# Patient Record
Sex: MALE | Race: WHITE | NOT HISPANIC OR LATINO | URBAN - METROPOLITAN AREA
[De-identification: names, ages, dates, MRNs, and addresses within clinical notes are randomized per-mention and may not be internally consistent; named-entity substitution may affect disease eponyms.]

---

## 2017-06-06 ENCOUNTER — ALLSCRIPTS OFFICE VISIT (OUTPATIENT)
Dept: OTHER | Facility: OTHER | Age: 57
End: 2017-06-06

## 2017-06-06 DIAGNOSIS — Z72.0 TOBACCO USE: ICD-10-CM

## 2018-01-11 NOTE — PROGRESS NOTES
Assessment    1  Encounter for preventive health examination (V70 0) (Z00 00)   2  Impaired fasting glucose (790 21) (R73 01)    Plan  DM (diabetes mellitus screen), Impaired fasting glucose, Screening cholesterol level,  Screening PSA (prostate specific antigen)    · ([facility]) CBC+Platelet+Hem Review; Status:Active; Requested TVN:[id number];    · ([facility]) Comp  Metabolic Panel (13); Status:Active; Requested BTM:[id number];    · ([facility]) Lipid Panel; Status:Active; Requested BIV:[id number];    · ([facility]) PSA Total (Reflex To Free); Status:Active; Requested QOD:[id number];   SocHx: Current smoker    · * CT LUNG SCREENING PROGRAM; Status:Hold For - Scheduling; Requested  DVF:[id number];     Discussion/Summary  Impression: health maintenance visit  Currently, he eats an adequate diet and has an adequate exercise regimen  Prostate cancer screening: PSA was ordered  Colorectal cancer screening: colorectal cancer screening is current, colonoscopy is needed every five years and the next colonoscopy is due 2018  Screening lab work includes glucose and lipid profile  The immunizations will be given as outlined in the orders  Advice and education were given regarding tobacco cessation  The patient was counseled regarding diagnostic results, impressions  The treatment plan was reviewed with the patient/guardian  The patient/guardian understands and agrees with the treatment plan      Chief Complaint  wellness      History of Present Illness  HM, Adult Male: The patient is being seen for a health maintenance evaluation  The last health maintenance visit was ? year(s) ago  Social History: Household members include spouse, 1 daughter(s), 1 son(s) and 10 y/o grandchild  He is   Work status: retired   The patient is a current cigarette smoker  He 1 Pack - 1 1/2 Pack a day  He is not ready to quit using tobacco  He reports never drinking alcohol  He has never used illicit drugs  General Health:  The patient's health since the last visit is described as good  He has regular dental visits  The patient reports his last dental visit was april 2017  Dental problems: no tooth pain and no caries  He complains of vision problems  Vision care includes wearing glasses and having regular eye examinations  He denies hearing loss  Hearing is normal   He doesn't wear a hearing aid  Immunizations status: not up to date The patient needs the following immunization(s): influenza vaccine  Lifestyle:  He consumes a diverse and healthy diet  He does not have any weight concerns  He exercises regularly  He exercises 7 times per week for 60 minutes per session  Exercise includes walking and swimming  He uses tobacco  The patient is a current cigarette smoker  He denies alcohol use  He reports never drinking alcohol  He denies drug use  He has never used illicit drugs  Reproductive health:  the patient is sexually active  He is monogamous with a male partner  birth control is not being practiced  He denies erectile dysfunction  Screening: Prostate cancer screening includes prostate-specific antigen testing last year  Colorectal cancer screening includes last colonoscopy done 2013  metabolic screening reviewed and current  Cardiovascular risk factors: tobacco use, but no hypertension, no diabetes, no high LDL cholesterol, no low HDL cholesterol, no stress, no obesity, no illicit drug use, no sedentary lifestyle and no family history of cardiovascular disease  Review of Systems    Constitutional: no fever, not feeling poorly, no recent weight gain, no chills, not feeling tired and no recent weight loss  Cardiovascular: no chest pain and no palpitations  Respiratory: no shortness of breath, no cough and no shortness of breath during exertion  Gastrointestinal: no abdominal pain, no constipation and no diarrhea  Genitourinary: nocturia, but no dysuria and no incontinence  Musculoskeletal: no arthralgias and no myalgias  Integumentary: no rashes  Active Problems    1  DM (diabetes mellitus screen) (V77 1) (Z13 1)   2  Erectile dysfunction (607 84) (N52 9)   3  Screening cholesterol level (V77 91) (Z13 220)   4  Screening PSA (prostate specific antigen) (V76 44) (Z12 5)    Past Medical History    · History of Inguinal hernia (550 90) (K40 90)    Surgical History    · History of Inguinal Hernia Repair    Family History  Father    · Family history of    · Family history of myocardial infarction (V17 3) (Z82 49)    Social History    · Denied: Alcohol Use (History)   · Current smoker (305 1) (Z72 0)   · 1 ppd since his teen years   · Exercising Regularly    Current Meds   1  Daily Value Multivitamin Oral Tablet Recorded   2  Viagra 100 MG Oral Tablet; TAKE  TABLET AS NEEDED; Therapy: 73CVC5540 to (Evaluate:2017); Last Rx:[id number] Ordered    Allergies    1  No Known Environmental Allergies    Vitals   Recorded: 40DPQ8679 10:37AM   Temperature 98 2 F   Heart Rate 78   Respiration 16   Systolic 485   Diastolic 60   Height 5 ft 8 2 in   Weight 157 lb 0 4 oz   BMI Calculated 23 74   BSA Calculated 1 85   O2 Saturation 98     Physical Exam    Constitutional   General appearance: No acute distress, well appearing and well nourished  Head and Face   Head and face: Normal     Eyes   Conjunctiva and lids: No erythema, swelling or discharge  Ears, Nose, Mouth, and Throat   Otoscopic examination: Tympanic membranes translucent with normal light reflex  Canals patent without erythema  Oropharynx: Normal with no erythema, edema, exudate or lesions  Neck   Neck: Supple, symmetric, trachea midline, no masses  Thyroid: Normal, no thyromegaly  Pulmonary   Respiratory effort: No increased work of breathing or signs of respiratory distress  Auscultation of lungs: Clear to auscultation  Cardiovascular   Auscultation of heart: Normal rate and rhythm, normal S1 and S2, no murmurs      Examination of extremities for edema and/or varicosities: Normal     Abdomen   Abdomen: Non-tender, no masses  Liver and spleen: No hepatomegaly or splenomegaly  Lymphatic   Palpation of lymph nodes in neck: No lymphadenopathy  Musculoskeletal   Gait and station: Normal     Psychiatric   Orientation to person, place and time: Normal     Mood and affect: Normal        Results/Data  PHQ-2 Adult Depression Screening 06Jun2017 10:39AM User, Ahs     Test Name Result Flag Reference   PHQ-2 Adult Depression Score 0     Over the last two weeks, how often have you been bothered by any of the following problems? Little interest or pleasure in doing things: Not at all - 0  Feeling down, depressed, or hopeless: Not at all - 0   PHQ-2 Adult Depression Screening Negative         Health Management  Health Maintenance   COLONOSCOPY; every 5 years; Last [id number]; Next Due: 32Htu0388;  Active    Signatures   Electronically signed by : GERARDO Corey ; Jun 6 2017 10:58AM EST                       (Author)

## 2018-01-12 VITALS
SYSTOLIC BLOOD PRESSURE: 100 MMHG | RESPIRATION RATE: 16 BRPM | DIASTOLIC BLOOD PRESSURE: 60 MMHG | BODY MASS INDEX: 23.8 KG/M2 | HEIGHT: 68 IN | HEART RATE: 78 BPM | WEIGHT: 157.03 LBS | TEMPERATURE: 98.2 F | OXYGEN SATURATION: 98 %

## 2018-06-07 ENCOUNTER — OFFICE VISIT (OUTPATIENT)
Dept: INTERNAL MEDICINE CLINIC | Facility: CLINIC | Age: 58
End: 2018-06-07
Payer: COMMERCIAL

## 2018-06-07 VITALS
DIASTOLIC BLOOD PRESSURE: 78 MMHG | BODY MASS INDEX: 21.89 KG/M2 | SYSTOLIC BLOOD PRESSURE: 119 MMHG | HEART RATE: 80 BPM | OXYGEN SATURATION: 97 % | WEIGHT: 161.6 LBS | RESPIRATION RATE: 16 BRPM | HEIGHT: 72 IN

## 2018-06-07 DIAGNOSIS — Z13.220 SCREENING, LIPID: ICD-10-CM

## 2018-06-07 DIAGNOSIS — B07.0 PLANTAR WART OF LEFT FOOT: ICD-10-CM

## 2018-06-07 DIAGNOSIS — F17.200 CURRENT EVERY DAY SMOKER: ICD-10-CM

## 2018-06-07 DIAGNOSIS — Z00.00 WELLNESS EXAMINATION: Primary | ICD-10-CM

## 2018-06-07 DIAGNOSIS — M79.672 LEFT FOOT PAIN: ICD-10-CM

## 2018-06-07 DIAGNOSIS — Z11.59 NEED FOR HEPATITIS C SCREENING TEST: ICD-10-CM

## 2018-06-07 DIAGNOSIS — N52.9 ERECTILE DYSFUNCTION, UNSPECIFIED ERECTILE DYSFUNCTION TYPE: ICD-10-CM

## 2018-06-07 DIAGNOSIS — Z13.1 SCREENING FOR DIABETES MELLITUS: ICD-10-CM

## 2018-06-07 DIAGNOSIS — R73.01 IMPAIRED FASTING GLUCOSE: ICD-10-CM

## 2018-06-07 DIAGNOSIS — Z12.5 SCREENING PSA (PROSTATE SPECIFIC ANTIGEN): ICD-10-CM

## 2018-06-07 PROCEDURE — 99396 PREV VISIT EST AGE 40-64: CPT | Performed by: INTERNAL MEDICINE

## 2018-06-07 RX ORDER — SILDENAFIL 100 MG/1
1 TABLET, FILM COATED ORAL DAILY PRN
COMMUNITY
Start: 2016-07-29 | End: 2021-06-09 | Stop reason: ALTCHOICE

## 2018-06-07 RX ORDER — ERGOCALCIFEROL (VITAMIN D2) 10 MCG
1 TABLET ORAL DAILY
COMMUNITY

## 2018-06-07 NOTE — PROGRESS NOTES
Assessment/Plan:  Impression: health maintenance visit  Currently, he eats an adequate diet and has an adequate exercise regimen  Prostate cancer screening: PSA was ordered  Colorectal cancer screening: colorectal cancer screening is current, colonoscopy is needed every five years and the next colonoscopy is due 2018  Screening lab work includes glucose and lipid profile  The immunizations will be given as outlined in the orders  Advice and education were given regarding tobacco cessation  The patient was counseled regarding diagnostic results, impressions  The treatment plan was reviewed with the patient/guardian  The patient/guardian understands and agrees with the treatment plan             Problem List Items Addressed This Visit     Erectile dysfunction    Impaired fasting glucose    Relevant Orders    CBC and differential    Comprehensive metabolic panel    Wellness examination - Primary    Screening for diabetes mellitus    Screening, lipid    Relevant Orders    Lipid panel    Current every day smoker    Relevant Orders    CT lung screening program    CBC and differential    Comprehensive metabolic panel    Plantar wart of left foot    Relevant Orders    Ambulatory referral to Podiatry    Need for hepatitis C screening test    Relevant Orders    Hepatitis C antibody    Screening PSA (prostate specific antigen)    Relevant Orders    PSA, Total Screen      Other Visit Diagnoses     Left foot pain                Subjective:      Patient ID: Patrica New is a 59 y o  male  HPI  History of Present Illness  HM, Adult Male: The patient is being seen for a health maintenance evaluation  The last health maintenance visit was 1 year(s) ago  Social History: Household members include spouse, 1 daughter(s), 1 son(s) and 6 y/o grandchild  He is   Work status: retired   The patient is a current cigarette smoker  He smokes 1 Pack - 1 1/2 Pack a day   He is not ready to quit using tobacco  He reports never drinking alcohol  He has never used illicit drugs  General Health: The patient's health since the last visit is described as good  He has regular dental visits  Dental problems: no tooth pain and no caries  He complains of vision problems  Vision care includes wearing glasses and having regular eye examinations  He denies hearing loss  Hearing is normal   He doesn't wear a hearing aid  Immunizations status: not up to date The patient needs the following immunization(s):He had Adacel, Pneumovax lat year   Lifestyle:  He consumes a diverse and healthy diet  He does not have any weight concerns  He exercises regularly  He exercises 7 times per week for 60 minutes per session  Exercise includes walking and swimming  He uses tobacco  The patient is a current cigarette smoker  He denies alcohol use  He reports never drinking alcohol  He denies drug use  He has never used illicit drugs  [facility] The patient is sexually active  He has erectile dysfunction  Screening: Prostate cancer screening includes prostate-specific antigen testing 2016  Colorectal cancer screening includes last colonoscopy done 2013 Dr Bree Troncoso  Metabolic screening due, last was 2016    Cardiovascular risk factors: tobacco use, but no hypertension, no diabetes, no high LDL cholesterol, no low HDL cholesterol, no stress, no obesity, no illicit drug use, no sedentary lifestyle and no family history of cardiovascular disease  The following portions of the patient's history were reviewed and updated as appropriate: allergies, current medications, past family history, past medical history, past social history, past surgical history and problem list     Review of Systems   Constitutional: Negative for fatigue, fever and unexpected weight change  HENT: Negative for ear pain, hearing loss, sinus pain, sinus pressure and sore throat  Respiratory: Negative for cough, shortness of breath and wheezing      Cardiovascular: Negative for chest pain, palpitations and leg swelling  Gastrointestinal: Negative for abdominal pain, constipation, diarrhea, nausea and vomiting  Musculoskeletal: Positive for arthralgias (h/o dislocation of both shoulders ; after shoveling in March, had R shoulder pain/dislocation what he reduced on his own  This is improving )  Negative for myalgias  Skin:        Plantar wart left great toe   Neurological: Negative for dizziness and headaches  Objective:      /78 (BP Location: Right arm, Patient Position: Sitting, Cuff Size: Standard)   Pulse 80   Resp 16   Ht 6' (1 829 m)   Wt 73 3 kg (161 lb 9 6 oz)   SpO2 97%   BMI 21 92 kg/m²          Physical Exam   Constitutional: He is oriented to person, place, and time  He appears well-developed and well-nourished  HENT:   Head: Normocephalic and atraumatic  Right Ear: External ear normal    Left Ear: External ear normal    Mouth/Throat: Oropharynx is clear and moist    Eyes: Conjunctivae are normal    Neck: Neck supple  Cardiovascular: Normal rate, regular rhythm and normal heart sounds  No murmur heard  Pulmonary/Chest: Effort normal and breath sounds normal  No respiratory distress  He has no wheezes  He has no rales  Abdominal: Soft  Bowel sounds are normal  He exhibits no distension and no mass  There is no tenderness  There is no rebound and no guarding  Musculoskeletal: Normal range of motion  Neurological: He is alert and oriented to person, place, and time  Skin: Skin is warm and dry  Psychiatric: He has a normal mood and affect   His behavior is normal  Judgment and thought content normal

## 2018-06-19 ENCOUNTER — HOSPITAL ENCOUNTER (OUTPATIENT)
Dept: RADIOLOGY | Age: 58
Discharge: HOME | End: 2018-06-19
Payer: COMMERCIAL

## 2018-06-19 DIAGNOSIS — F17.200 CURRENT EVERY DAY SMOKER: ICD-10-CM

## 2018-07-27 ENCOUNTER — APPOINTMENT (OUTPATIENT)
Dept: LAB | Facility: CLINIC | Age: 58
End: 2018-07-27
Payer: COMMERCIAL

## 2018-07-27 LAB
ALBUMIN SERPL BCP-MCNC: 3.8 G/DL (ref 3.5–5)
ALP SERPL-CCNC: 77 U/L (ref 46–116)
ALT SERPL W P-5'-P-CCNC: 29 U/L (ref 12–78)
ANION GAP SERPL CALCULATED.3IONS-SCNC: 7 MMOL/L (ref 4–13)
AST SERPL W P-5'-P-CCNC: 19 U/L (ref 5–45)
BASOPHILS # BLD AUTO: 0.03 THOUSANDS/ΜL (ref 0–0.1)
BASOPHILS NFR BLD AUTO: 0 % (ref 0–1)
BILIRUB SERPL-MCNC: 0.8 MG/DL (ref 0.2–1)
BUN SERPL-MCNC: 16 MG/DL (ref 5–25)
CALCIUM SERPL-MCNC: 8.5 MG/DL (ref 8.3–10.1)
CHLORIDE SERPL-SCNC: 103 MMOL/L (ref 100–108)
CHOLEST SERPL-MCNC: 216 MG/DL (ref 50–200)
CO2 SERPL-SCNC: 26 MMOL/L (ref 21–32)
CREAT SERPL-MCNC: 0.98 MG/DL (ref 0.6–1.3)
EOSINOPHIL # BLD AUTO: 0.12 THOUSAND/ΜL (ref 0–0.61)
EOSINOPHIL NFR BLD AUTO: 1 % (ref 0–6)
ERYTHROCYTE [DISTWIDTH] IN BLOOD BY AUTOMATED COUNT: 14.2 % (ref 11.6–15.1)
GFR SERPL CREATININE-BSD FRML MDRD: 83 ML/MIN/1.73SQ M
GLUCOSE P FAST SERPL-MCNC: 97 MG/DL (ref 65–99)
HCT VFR BLD AUTO: 45.2 % (ref 36.5–49.3)
HCV AB SER QL: NORMAL
HDLC SERPL-MCNC: 48 MG/DL (ref 40–60)
HGB BLD-MCNC: 14.5 G/DL (ref 12–17)
IMM GRANULOCYTES # BLD AUTO: 0.02 THOUSAND/UL (ref 0–0.2)
IMM GRANULOCYTES NFR BLD AUTO: 0 % (ref 0–2)
LDLC SERPL CALC-MCNC: 151 MG/DL (ref 0–100)
LYMPHOCYTES # BLD AUTO: 3.59 THOUSANDS/ΜL (ref 0.6–4.47)
LYMPHOCYTES NFR BLD AUTO: 41 % (ref 14–44)
MCH RBC QN AUTO: 28.2 PG (ref 26.8–34.3)
MCHC RBC AUTO-ENTMCNC: 32.1 G/DL (ref 31.4–37.4)
MCV RBC AUTO: 88 FL (ref 82–98)
MONOCYTES # BLD AUTO: 0.79 THOUSAND/ΜL (ref 0.17–1.22)
MONOCYTES NFR BLD AUTO: 9 % (ref 4–12)
NEUTROPHILS # BLD AUTO: 4.32 THOUSANDS/ΜL (ref 1.85–7.62)
NEUTS SEG NFR BLD AUTO: 49 % (ref 43–75)
NONHDLC SERPL-MCNC: 168 MG/DL
NRBC BLD AUTO-RTO: 0 /100 WBCS
PLATELET # BLD AUTO: 253 THOUSANDS/UL (ref 149–390)
PMV BLD AUTO: 11.7 FL (ref 8.9–12.7)
POTASSIUM SERPL-SCNC: 3.7 MMOL/L (ref 3.5–5.3)
PROT SERPL-MCNC: 7.4 G/DL (ref 6.4–8.2)
PSA SERPL-MCNC: 2.3 NG/ML (ref 0–4)
RBC # BLD AUTO: 5.15 MILLION/UL (ref 3.88–5.62)
SODIUM SERPL-SCNC: 136 MMOL/L (ref 136–145)
TRIGL SERPL-MCNC: 86 MG/DL
WBC # BLD AUTO: 8.87 THOUSAND/UL (ref 4.31–10.16)

## 2018-07-27 PROCEDURE — 86803 HEPATITIS C AB TEST: CPT | Performed by: INTERNAL MEDICINE

## 2018-07-27 PROCEDURE — 80053 COMPREHEN METABOLIC PANEL: CPT | Performed by: INTERNAL MEDICINE

## 2018-07-27 PROCEDURE — 85025 COMPLETE CBC W/AUTO DIFF WBC: CPT | Performed by: INTERNAL MEDICINE

## 2018-07-27 PROCEDURE — G0103 PSA SCREENING: HCPCS | Performed by: INTERNAL MEDICINE

## 2018-07-27 PROCEDURE — 36415 COLL VENOUS BLD VENIPUNCTURE: CPT | Performed by: INTERNAL MEDICINE

## 2018-07-27 PROCEDURE — 80061 LIPID PANEL: CPT | Performed by: INTERNAL MEDICINE

## 2018-08-03 DIAGNOSIS — R73.01 IMPAIRED FASTING GLUCOSE: Primary | ICD-10-CM

## 2018-08-03 DIAGNOSIS — E78.2 MIXED HYPERLIPIDEMIA: ICD-10-CM

## 2019-06-10 ENCOUNTER — OFFICE VISIT (OUTPATIENT)
Dept: INTERNAL MEDICINE CLINIC | Facility: CLINIC | Age: 59
End: 2019-06-10
Payer: COMMERCIAL

## 2019-06-10 VITALS
HEART RATE: 67 BPM | WEIGHT: 165 LBS | OXYGEN SATURATION: 97 % | BODY MASS INDEX: 22.35 KG/M2 | HEIGHT: 72 IN | DIASTOLIC BLOOD PRESSURE: 64 MMHG | SYSTOLIC BLOOD PRESSURE: 116 MMHG

## 2019-06-10 DIAGNOSIS — E78.2 MIXED HYPERLIPIDEMIA: ICD-10-CM

## 2019-06-10 DIAGNOSIS — L98.9 LESION OF SKIN OF SCALP: ICD-10-CM

## 2019-06-10 DIAGNOSIS — Z13.1 SCREENING FOR DIABETES MELLITUS: ICD-10-CM

## 2019-06-10 DIAGNOSIS — Z00.00 WELLNESS EXAMINATION: Primary | ICD-10-CM

## 2019-06-10 DIAGNOSIS — Z12.11 SCREENING FOR COLON CANCER: ICD-10-CM

## 2019-06-10 DIAGNOSIS — F17.200 CURRENT EVERY DAY SMOKER: ICD-10-CM

## 2019-06-10 DIAGNOSIS — Z12.5 SCREENING PSA (PROSTATE SPECIFIC ANTIGEN): ICD-10-CM

## 2019-06-10 PROBLEM — Z11.59 NEED FOR HEPATITIS C SCREENING TEST: Status: RESOLVED | Noted: 2018-06-07 | Resolved: 2019-06-10

## 2019-06-10 PROBLEM — Z13.220 SCREENING, LIPID: Status: RESOLVED | Noted: 2018-06-07 | Resolved: 2019-06-10

## 2019-06-10 PROCEDURE — 99396 PREV VISIT EST AGE 40-64: CPT | Performed by: INTERNAL MEDICINE

## 2021-01-13 ENCOUNTER — DOCUMENTATION (OUTPATIENT)
Dept: INTERNAL MEDICINE CLINIC | Facility: CLINIC | Age: 61
End: 2021-01-13

## 2021-01-13 NOTE — PROGRESS NOTES
Three attempts to contact this patient were made to schedule the shingrix vaccine  The third attempt was made on 1/5/21

## 2021-03-30 DIAGNOSIS — Z23 ENCOUNTER FOR IMMUNIZATION: ICD-10-CM

## 2021-04-02 ENCOUNTER — DOCUMENTATION (OUTPATIENT)
Dept: INTERNAL MEDICINE CLINIC | Facility: CLINIC | Age: 61
End: 2021-04-02

## 2021-04-06 ENCOUNTER — IMMUNIZATIONS (OUTPATIENT)
Dept: FAMILY MEDICINE CLINIC | Facility: HOSPITAL | Age: 61
End: 2021-04-06

## 2021-04-06 DIAGNOSIS — Z23 ENCOUNTER FOR IMMUNIZATION: Primary | ICD-10-CM

## 2021-04-06 PROCEDURE — 91300 SARS-COV-2 / COVID-19 MRNA VACCINE (PFIZER-BIONTECH) 30 MCG: CPT

## 2021-04-06 PROCEDURE — 0001A SARS-COV-2 / COVID-19 MRNA VACCINE (PFIZER-BIONTECH) 30 MCG: CPT

## 2021-04-29 ENCOUNTER — IMMUNIZATIONS (OUTPATIENT)
Dept: FAMILY MEDICINE CLINIC | Facility: HOSPITAL | Age: 61
End: 2021-04-29

## 2021-04-29 DIAGNOSIS — Z23 ENCOUNTER FOR IMMUNIZATION: Primary | ICD-10-CM

## 2021-04-29 PROCEDURE — 91300 SARS-COV-2 / COVID-19 MRNA VACCINE (PFIZER-BIONTECH) 30 MCG: CPT

## 2021-04-29 PROCEDURE — 0002A SARS-COV-2 / COVID-19 MRNA VACCINE (PFIZER-BIONTECH) 30 MCG: CPT

## 2021-06-15 ENCOUNTER — OFFICE VISIT (OUTPATIENT)
Dept: INTERNAL MEDICINE CLINIC | Facility: CLINIC | Age: 61
End: 2021-06-15
Payer: COMMERCIAL

## 2021-06-15 VITALS
HEIGHT: 72 IN | HEART RATE: 71 BPM | BODY MASS INDEX: 22.48 KG/M2 | WEIGHT: 166 LBS | DIASTOLIC BLOOD PRESSURE: 76 MMHG | SYSTOLIC BLOOD PRESSURE: 112 MMHG | OXYGEN SATURATION: 97 %

## 2021-06-15 DIAGNOSIS — Z12.2 ENCOUNTER FOR SCREENING FOR LUNG CANCER: ICD-10-CM

## 2021-06-15 DIAGNOSIS — E78.2 MIXED HYPERLIPIDEMIA: ICD-10-CM

## 2021-06-15 DIAGNOSIS — Z12.11 SCREEN FOR COLON CANCER: ICD-10-CM

## 2021-06-15 DIAGNOSIS — Z00.00 ANNUAL PHYSICAL EXAM: Primary | ICD-10-CM

## 2021-06-15 DIAGNOSIS — Z00.00 LABORATORY EXAM ORDERED AS PART OF ROUTINE GENERAL MEDICAL EXAMINATION: ICD-10-CM

## 2021-06-15 DIAGNOSIS — Z12.5 SCREENING PSA (PROSTATE SPECIFIC ANTIGEN): ICD-10-CM

## 2021-06-15 DIAGNOSIS — Z11.4 SCREENING FOR HIV (HUMAN IMMUNODEFICIENCY VIRUS): ICD-10-CM

## 2021-06-15 DIAGNOSIS — F17.200 CURRENT EVERY DAY SMOKER: ICD-10-CM

## 2021-06-15 PROCEDURE — 4004F PT TOBACCO SCREEN RCVD TLK: CPT | Performed by: INTERNAL MEDICINE

## 2021-06-15 PROCEDURE — 3008F BODY MASS INDEX DOCD: CPT | Performed by: INTERNAL MEDICINE

## 2021-06-15 PROCEDURE — 3725F SCREEN DEPRESSION PERFORMED: CPT | Performed by: INTERNAL MEDICINE

## 2021-06-15 PROCEDURE — 99396 PREV VISIT EST AGE 40-64: CPT | Performed by: INTERNAL MEDICINE

## 2021-06-15 NOTE — PROGRESS NOTES
[facility], [place]    NAME: Olga Khalil  AGE: 59 y o  SEX: male  : 1960     DATE: 6/15/2021     Assessment and Plan:     Problem List Items Addressed This Visit        Other    Screening PSA (prostate specific antigen)    Relevant Orders    PSA, Total Screen    Current every day smoker    Relevant Orders    CT lung screening program    Mixed hyperlipidemia    Relevant Orders    CBC and differential    Comprehensive metabolic panel    Lipid Panel with Direct LDL reflex      Other Visit Diagnoses     Annual physical exam    -  Primary    Screening for HIV (human immunodeficiency virus)        Relevant Orders    HIV 1/2 [facility]ib[facility]4th Generation[facility]    Laboratory exam ordered as part of routine general medical examination        Relevant Orders    CBC and differential    Comprehensive metabolic panel    Lipid Panel with Direct LDL reflex    Encounter for screening for lung cancer        Relevant Orders    CT lung screening program    Screen for colon cancer        Relevant Orders    Cologuard          Immunizations and preventive care screenings were discussed with patient today  Appropriate education was printed on patient's after visit summary  Consider Shingrix    Counseling:  · Smoking cessation  Tobacco Cessation Counseling: Tobacco cessation counseling and education was provided  The patient is sincerely urged to quit consumption of tobacco  He is not ready to quit tobacco  The numerous health risks of tobacco consumption were discussed  If he decides to quit, there are a number of helpful adjunctive aids, and he can see me to discuss nicotine replacement therapy, chantix, or bupropion anytime in the future  Return in about 1 year (around 6/15/2022)       Chief Complaint:     Chief Complaint   Patient presents with    Annual Exam      History of Present Illness:     Adult Annual Physical   Patient here for a comprehensive physical exam  The patient reports no problems  Diet and Physical Activity  · Diet/Nutrition: well balanced diet, limited junk food and consuming 3-5 servings of fruits/vegetables daily  · Exercise: walking and 5-7 times a week on average  Depression Screening  PHQ-9 Depression Screening    PHQ-9:   Frequency of the following problems over the past two weeks:      Little interest or pleasure in doing things: 0 - not at all  Feeling down, depressed, or hopeless: 0 - not at all  PHQ-2 Score: 0       General Health  · Sleep: sleeps well  · Hearing: normal - bilateral   · Vision: most recent eye exam >1 year ago  · Dental: regular dental visits   Health  · Symptoms include: none     Review of Systems:     Review of Systems   Constitutional: Negative for chills, fatigue, fever and unexpected weight change  HENT: Negative for congestion, rhinorrhea and sore throat  Respiratory: Negative for cough, shortness of breath and wheezing  Cardiovascular: Negative for chest pain, palpitations and leg swelling  Gastrointestinal: Negative for abdominal pain, blood in stool, constipation, diarrhea, nausea and vomiting  Genitourinary: Negative for difficulty urinating  No nocturia   Musculoskeletal: Positive for arthralgias (right shoulder) and back pain  Negative for myalgias  Neurological: Negative for dizziness and headaches  Psychiatric/Behavioral: Negative for dysphoric mood and sleep disturbance  The patient is not nervous/anxious         Past Medical History:     Past Medical History:   Diagnosis Date    Inguinal hernia     last assessed 9/3/13; resolved 7/29/16      Past Surgical History:     Past Surgical History:   Procedure Laterality Date    INGUINAL HERNIA REPAIR        Family History:     Family History   Problem Relation Age of Onset    Heart attack Father         myocardial infarction    Alcohol abuse Mother       Social History:     Social History     Socioeconomic History    Marital status: /Civil Union     Spouse name: None    Number of children: None    Years of education: None    Highest education level: None   Occupational History    None   Tobacco Use    Smoking status: Current Every Day Smoker    Smokeless tobacco: Never Used    Tobacco comment: 1 ppd since his teen years    Substance and Sexual Activity    Alcohol use: No    Drug use: Yes     Types: Marijuana    Sexual activity: None   Other Topics Concern    None   Social History Narrative    Exercising regularly      Social Determinants of Health     Financial Resource Strain:     Difficulty of Paying Living Expenses:    Food Insecurity:     Worried About Running Out of Food in the Last Year:     Ran Out of Food in the Last Year:    Transportation Needs:     Lack of Transportation (Medical):  Lack of Transportation (Non-Medical):    Physical Activity:     Days of Exercise per Week:     Minutes of Exercise per Session:    Stress:     Feeling of Stress :    Social Connections:     Frequency of Communication with Friends and Family:     Frequency of Social Gatherings with Friends and Family:     Attends Jew Services:     Active Member of Clubs or Organizations:     Attends Club or Organization Meetings:     Marital Status:    Intimate Partner Violence:     Fear of Current or Ex-Partner:     Emotionally Abused:     Physically Abused:     Sexually Abused:       Current Medications:     Current Outpatient Medications   Medication Sig Dispense Refill    Multiple Vitamin (DAILY VALUE MULTIVITAMIN) TABS Take 1 tablet by mouth daily       No current facility-administered medications for this visit  Allergies:     No Known Allergies   Physical Exam:     /76   Pulse 71   Ht 6' (1 829 m)   Wt 75 3 kg (166 lb)   SpO2 97%   BMI 22 51 kg/m²     Physical Exam  Vitals and nursing note reviewed     Constitutional:       General: He is not in acute distress  Appearance: He is well-developed  He is not ill-appearing, toxic-appearing or diaphoretic  HENT:      Head: Normocephalic and atraumatic  Eyes:      Conjunctiva/sclera: Conjunctivae normal    Cardiovascular:      Rate and Rhythm: Normal rate and regular rhythm  Heart sounds: No murmur heard  Pulmonary:      Effort: Pulmonary effort is normal  No respiratory distress  Breath sounds: Normal breath sounds  Abdominal:      General: There is no distension  Palpations: Abdomen is soft  There is no mass  Tenderness: There is no abdominal tenderness  There is no guarding or rebound  Musculoskeletal:      Cervical back: Neck supple  Right lower leg: No edema  Left lower leg: No edema  Skin:     General: Skin is warm and dry  Neurological:      Mental Status: He is alert and oriented to person, place, and time  Psychiatric:         Mood and Affect: Mood normal          Behavior: Behavior normal          Thought Content:  Thought content normal          Judgment: Judgment normal           Isabell Miller MD  [facility]

## 2021-06-15 NOTE — PATIENT INSTRUCTIONS

## 2021-06-18 ENCOUNTER — VBI (OUTPATIENT)
Dept: ADMINISTRATIVE | Facility: OTHER | Age: 61
End: 2021-06-18

## 2021-12-22 ENCOUNTER — IMMUNIZATIONS (OUTPATIENT)
Dept: FAMILY MEDICINE CLINIC | Facility: HOSPITAL | Age: 61
End: 2021-12-22

## 2021-12-22 DIAGNOSIS — Z23 ENCOUNTER FOR IMMUNIZATION: Primary | ICD-10-CM

## 2021-12-22 PROCEDURE — 0001A COVID-19 PFIZER VACC 0.3 ML: CPT

## 2021-12-22 PROCEDURE — 91300 COVID-19 PFIZER VACC 0.3 ML: CPT

## 2022-06-17 DIAGNOSIS — Z11.4 SCREENING FOR HIV (HUMAN IMMUNODEFICIENCY VIRUS): ICD-10-CM

## 2022-06-17 DIAGNOSIS — Z12.5 SCREENING PSA (PROSTATE SPECIFIC ANTIGEN): Primary | ICD-10-CM

## 2022-06-17 DIAGNOSIS — E78.2 MIXED HYPERLIPIDEMIA: ICD-10-CM

## 2022-06-29 ENCOUNTER — APPOINTMENT (OUTPATIENT)
Dept: LAB | Facility: CLINIC | Age: 62
End: 2022-06-29
Payer: COMMERCIAL

## 2022-06-29 DIAGNOSIS — E78.2 MIXED HYPERLIPIDEMIA: ICD-10-CM

## 2022-06-29 DIAGNOSIS — Z11.4 SCREENING FOR HIV (HUMAN IMMUNODEFICIENCY VIRUS): ICD-10-CM

## 2022-06-29 DIAGNOSIS — Z12.5 SCREENING PSA (PROSTATE SPECIFIC ANTIGEN): ICD-10-CM

## 2022-06-29 LAB
ALBUMIN SERPL BCP-MCNC: 3.5 G/DL (ref 3.5–5)
ALP SERPL-CCNC: 101 U/L (ref 46–116)
ALT SERPL W P-5'-P-CCNC: 28 U/L (ref 12–78)
ANION GAP SERPL CALCULATED.3IONS-SCNC: 4 MMOL/L (ref 4–13)
AST SERPL W P-5'-P-CCNC: 21 U/L (ref 5–45)
BASOPHILS # BLD AUTO: 0.03 THOUSANDS/ΜL (ref 0–0.1)
BASOPHILS NFR BLD AUTO: 0 % (ref 0–1)
BILIRUB SERPL-MCNC: 0.5 MG/DL (ref 0.2–1)
BUN SERPL-MCNC: 16 MG/DL (ref 5–25)
CALCIUM SERPL-MCNC: 9 MG/DL (ref 8.3–10.1)
CHLORIDE SERPL-SCNC: 107 MMOL/L (ref 100–108)
CHOLEST SERPL-MCNC: 242 MG/DL
CO2 SERPL-SCNC: 29 MMOL/L (ref 21–32)
CREAT SERPL-MCNC: 0.95 MG/DL (ref 0.6–1.3)
EOSINOPHIL # BLD AUTO: 0.15 THOUSAND/ΜL (ref 0–0.61)
EOSINOPHIL NFR BLD AUTO: 2 % (ref 0–6)
ERYTHROCYTE [DISTWIDTH] IN BLOOD BY AUTOMATED COUNT: 14.6 % (ref 11.6–15.1)
GFR SERPL CREATININE-BSD FRML MDRD: 84 ML/MIN/1.73SQ M
GLUCOSE P FAST SERPL-MCNC: 124 MG/DL (ref 65–99)
HCT VFR BLD AUTO: 45.8 % (ref 36.5–49.3)
HDLC SERPL-MCNC: 40 MG/DL
HGB BLD-MCNC: 15 G/DL (ref 12–17)
IMM GRANULOCYTES # BLD AUTO: 0.03 THOUSAND/UL (ref 0–0.2)
IMM GRANULOCYTES NFR BLD AUTO: 0 % (ref 0–2)
LDLC SERPL CALC-MCNC: 177 MG/DL (ref 0–100)
LYMPHOCYTES # BLD AUTO: 3.16 THOUSANDS/ΜL (ref 0.6–4.47)
LYMPHOCYTES NFR BLD AUTO: 36 % (ref 14–44)
MCH RBC QN AUTO: 28.2 PG (ref 26.8–34.3)
MCHC RBC AUTO-ENTMCNC: 32.8 G/DL (ref 31.4–37.4)
MCV RBC AUTO: 86 FL (ref 82–98)
MONOCYTES # BLD AUTO: 0.95 THOUSAND/ΜL (ref 0.17–1.22)
MONOCYTES NFR BLD AUTO: 11 % (ref 4–12)
NEUTROPHILS # BLD AUTO: 4.53 THOUSANDS/ΜL (ref 1.85–7.62)
NEUTS SEG NFR BLD AUTO: 51 % (ref 43–75)
NRBC BLD AUTO-RTO: 0 /100 WBCS
PLATELET # BLD AUTO: 247 THOUSANDS/UL (ref 149–390)
PMV BLD AUTO: 11.6 FL (ref 8.9–12.7)
POTASSIUM SERPL-SCNC: 4.3 MMOL/L (ref 3.5–5.3)
PROT SERPL-MCNC: 7.5 G/DL (ref 6.4–8.2)
PSA SERPL-MCNC: 2.3 NG/ML (ref 0–4)
RBC # BLD AUTO: 5.31 MILLION/UL (ref 3.88–5.62)
SODIUM SERPL-SCNC: 140 MMOL/L (ref 136–145)
TRIGL SERPL-MCNC: 126 MG/DL
WBC # BLD AUTO: 8.85 THOUSAND/UL (ref 4.31–10.16)

## 2022-06-29 PROCEDURE — 85025 COMPLETE CBC W/AUTO DIFF WBC: CPT

## 2022-06-29 PROCEDURE — 87389 HIV-1 AG W/HIV-1&-2 AB AG IA: CPT

## 2022-06-29 PROCEDURE — 80053 COMPREHEN METABOLIC PANEL: CPT

## 2022-06-29 PROCEDURE — 36415 COLL VENOUS BLD VENIPUNCTURE: CPT

## 2022-06-29 PROCEDURE — 80061 LIPID PANEL: CPT

## 2022-06-29 PROCEDURE — G0103 PSA SCREENING: HCPCS

## 2022-06-30 LAB — HIV 1+2 AB+HIV1 P24 AG SERPL QL IA: NORMAL

## 2022-07-01 ENCOUNTER — RA CDI HCC (OUTPATIENT)
Dept: OTHER | Facility: HOSPITAL | Age: 62
End: 2022-07-01

## 2022-07-01 NOTE — PROGRESS NOTES
[facility]  coding opportunities       Chart reviewed, no opportunity found: CHART REVIEWED, NO OPPORTUNITY FOUND        Patients Insurance        Commercial Insurance: Yudith Lee

## 2022-07-07 ENCOUNTER — HOSPITAL ENCOUNTER (OUTPATIENT)
Dept: CT IMAGING | Facility: HOSPITAL | Age: 62
Discharge: HOME | End: 2022-07-07
Payer: COMMERCIAL

## 2022-07-07 DIAGNOSIS — Z12.2 ENCOUNTER FOR SCREENING FOR LUNG CANCER: ICD-10-CM

## 2022-07-07 DIAGNOSIS — F17.200 CURRENT EVERY DAY SMOKER: ICD-10-CM

## 2022-07-07 PROCEDURE — 71271 CT THORAX LUNG CANCER SCR C-: CPT

## 2022-07-08 ENCOUNTER — OFFICE VISIT (OUTPATIENT)
Dept: INTERNAL MEDICINE CLINIC | Facility: CLINIC | Age: 62
End: 2022-07-08
Payer: COMMERCIAL

## 2022-07-08 VITALS
HEIGHT: 70 IN | WEIGHT: 163.4 LBS | DIASTOLIC BLOOD PRESSURE: 78 MMHG | HEART RATE: 78 BPM | OXYGEN SATURATION: 98 % | BODY MASS INDEX: 23.39 KG/M2 | SYSTOLIC BLOOD PRESSURE: 118 MMHG

## 2022-07-08 DIAGNOSIS — Z12.5 SCREENING PSA (PROSTATE SPECIFIC ANTIGEN): ICD-10-CM

## 2022-07-08 DIAGNOSIS — Z00.00 ANNUAL PHYSICAL EXAM: Primary | ICD-10-CM

## 2022-07-08 DIAGNOSIS — R73.01 IMPAIRED FASTING GLUCOSE: ICD-10-CM

## 2022-07-08 DIAGNOSIS — F17.200 CURRENT EVERY DAY SMOKER: ICD-10-CM

## 2022-07-08 DIAGNOSIS — E78.2 MIXED HYPERLIPIDEMIA: ICD-10-CM

## 2022-07-08 PROBLEM — Z13.1 SCREENING FOR DIABETES MELLITUS: Status: RESOLVED | Noted: 2018-06-07 | Resolved: 2022-07-08

## 2022-07-08 LAB — SL AMB POCT HEMOGLOBIN AIC: 6.2 (ref ?–6.5)

## 2022-07-08 PROCEDURE — 99396 PREV VISIT EST AGE 40-64: CPT | Performed by: INTERNAL MEDICINE

## 2022-07-08 PROCEDURE — 83036 HEMOGLOBIN GLYCOSYLATED A1C: CPT | Performed by: INTERNAL MEDICINE

## 2022-07-08 PROCEDURE — 3725F SCREEN DEPRESSION PERFORMED: CPT | Performed by: INTERNAL MEDICINE

## 2022-07-08 RX ORDER — ATORVASTATIN CALCIUM 20 MG/1
20 TABLET, FILM COATED ORAL DAILY
Qty: 90 TABLET | Refills: 3 | Status: SHIPPED | OUTPATIENT
Start: 2022-07-08

## 2022-07-08 NOTE — PROGRESS NOTES
[facility] [employer] [facility]    NAME: Rosario Pulliam  AGE: 57 y o  SEX: male  : 1960     DATE: 2022     Assessment and Plan:     Problem List Items Addressed This Visit        Endocrine    Impaired fasting glucose    Relevant Orders    POCT hemoglobin A1c (Completed)    CBC and differential    Comprehensive metabolic panel    HEMOGLOBIN A1C W/ EAG ESTIMATION    HEMOGLOBIN A1C W/ EAG ESTIMATION       Other    Screening PSA (prostate specific antigen)    Relevant Orders    PSA, Total Screen    Current every day smoker     Tobacco Cessation Counseling: Tobacco cessation counseling and education was provided  The patient is sincerely urged to quit consumption of tobacco  He is not ready to quit tobacco  The numerous health risks of tobacco consumption were discussed  If he decides to quit, there are a number of helpful adjunctive aids, and he can see me to discuss nicotine replacement therapy, chantix, or bupropion anytime in the future  Mixed hyperlipidemia     Agrees to starting atorvastatin 20mg  Labs in 3months           Relevant Medications    atorvastatin (LIPITOR) 20 mg tablet    Other Relevant Orders    Lipid Panel with Direct LDL reflex    Hepatic function panel    CBC and differential    Comprehensive metabolic panel    Lipid Panel with Direct LDL reflex      Other Visit Diagnoses     Annual physical exam    -  Primary          Immunizations and preventive care screenings were discussed with patient today  Appropriate education was printed on patient's after visit summary  Counseling:  Consider Shingrix  Limit sweets and carbs       Return in about 1 year (around 2023) [facility]  Chief Complaint:     No chief complaint on file  History of Present Illness:     Adult Annual Physical   Patient here for a comprehensive physical exam  The patient reports no problems      Labs reviewed-elevated LDL and FBS    Retired    Diet and Physical Activity  Diet/Nutrition: well balanced diet, low fat diet, consuming 3-5 servings of fruits/vegetables daily and mostly lactose free  Exercise: walking  Physically active walks, hikes     Depression Screening  PHQ-2/9 Depression Screening    Little interest or pleasure in doing things: 0 - not at all  Feeling down, depressed, or hopeless: 0 - not at all  PHQ-2 Score: 0  PHQ-2 Interpretation: Negative depression screen       General Health  Sleep: sleeps well  Hearing: normal - bilateral   Vision: no vision problems and most recent eye exam >1 year ago  Dental: regular dental visits   Health  Symptoms include: none     Review of Systems:     Review of Systems   Constitutional: Negative for chills, fever and unexpected weight change  HENT: Negative for congestion, hearing loss and rhinorrhea  Respiratory: Negative for cough and shortness of breath  Cardiovascular: Negative for chest pain, palpitations and leg swelling  Gastrointestinal: Negative for abdominal pain, constipation, diarrhea, nausea and vomiting  Genitourinary: Negative for difficulty urinating  Musculoskeletal: Negative for arthralgias and myalgias  Neurological: Negative for dizziness and headaches        Past Medical History:     Past Medical History:   Diagnosis Date    Inguinal hernia     last assessed 9/3/13; resolved 7/29/16    Screening for diabetes mellitus 6/7/2018    Wellness examination 6/7/2018      Past Surgical History:     Past Surgical History:   Procedure Laterality Date    INGUINAL HERNIA REPAIR        Family History:     Family History   Problem Relation Age of Onset    Heart attack Father         myocardial infarction    Alcohol abuse Mother       Social History:     Social History     Socioeconomic History    Marital status: /Civil Union     Spouse name: None    Number of children: None    Years of education: None    Highest education level: None   Occupational History    None   Tobacco Use    Smoking status: Current Every Day Smoker     Packs/day: 1 00     Types: Cigarettes     Start date: 10    Smokeless tobacco: Never Used   Vaping Use    Vaping Use: Every day    Start date: 4/14/2020    Substances: THC   Substance and Sexual Activity    Alcohol use: No    Drug use: Yes     Types: Marijuana    Sexual activity: Yes   Other Topics Concern    None   Social History Narrative    Exercising regularly      Social Determinants of Health     Financial Resource Strain: Not on file   Food Insecurity: Not on file   Transportation Needs: Not on file   Physical Activity: Not on file   Stress: Not on file   Social Connections: Not on file   Intimate Partner Violence: Not on file   Housing Stability: Not on file      Current Medications:     Current Outpatient Medications   Medication Sig Dispense Refill    atorvastatin (LIPITOR) 20 mg tablet Take 1 tablet (20 mg total) by mouth daily 90 tablet 3    Multiple Vitamin (DAILY VALUE MULTIVITAMIN) TABS Take 1 tablet by mouth daily       No current facility-administered medications for this visit        Allergies:     No Known Allergies   Physical Exam:     /78   Pulse 78   Ht 5' 9 69" (1 77 m)   Wt 74 1 kg (163 lb 6 4 oz)   SpO2 98%   BMI 23 66 kg/m²     Physical Exam     Kaley Bee MD  [facility]

## 2022-07-08 NOTE — PATIENT INSTRUCTIONS

## 2022-07-19 ENCOUNTER — HOSPITAL ENCOUNTER (OUTPATIENT)
Dept: NON INVASIVE DIAGNOSTICS | Facility: HOSPITAL | Age: 62
Discharge: HOME | End: 2022-07-19
Payer: COMMERCIAL

## 2022-07-19 DIAGNOSIS — E78.2 MIXED HYPERLIPIDEMIA: ICD-10-CM

## 2022-07-19 DIAGNOSIS — F17.200 CURRENT EVERY DAY SMOKER: ICD-10-CM

## 2022-07-19 PROCEDURE — 93922 UPR/L XTREMITY ART 2 LEVELS: CPT | Performed by: SURGERY

## 2022-07-19 PROCEDURE — 93880 EXTRACRANIAL BILAT STUDY: CPT | Performed by: SURGERY

## 2022-07-19 PROCEDURE — 93922 UPR/L XTREMITY ART 2 LEVELS: CPT

## 2022-07-19 PROCEDURE — 93978 VASCULAR STUDY: CPT | Performed by: SURGERY

## 2022-07-31 DIAGNOSIS — I65.22 LEFT CAROTID STENOSIS: Primary | ICD-10-CM

## 2022-08-29 NOTE — TELEPHONE ENCOUNTER
06/18/21 11:01 AM     See documentation in the [employer] SmartForm       Tiburcio Velazquez MA English

## 2022-10-05 ENCOUNTER — APPOINTMENT (OUTPATIENT)
Dept: LAB | Facility: CLINIC | Age: 62
End: 2022-10-05
Payer: COMMERCIAL

## 2022-10-05 DIAGNOSIS — R73.01 IMPAIRED FASTING GLUCOSE: ICD-10-CM

## 2022-10-05 DIAGNOSIS — E78.2 MIXED HYPERLIPIDEMIA: ICD-10-CM

## 2022-10-05 LAB
ALBUMIN SERPL BCP-MCNC: 3.8 G/DL (ref 3.5–5)
ALP SERPL-CCNC: 103 U/L (ref 46–116)
ALT SERPL W P-5'-P-CCNC: 64 U/L (ref 12–78)
AST SERPL W P-5'-P-CCNC: 31 U/L (ref 5–45)
BILIRUB DIRECT SERPL-MCNC: 0.15 MG/DL (ref 0–0.2)
BILIRUB SERPL-MCNC: 0.55 MG/DL (ref 0.2–1)
CHOLEST SERPL-MCNC: 127 MG/DL
EST. AVERAGE GLUCOSE BLD GHB EST-MCNC: 131 MG/DL
HBA1C MFR BLD: 6.2 %
HDLC SERPL-MCNC: 51 MG/DL
LDLC SERPL CALC-MCNC: 60 MG/DL (ref 0–100)
PROT SERPL-MCNC: 7.3 G/DL (ref 6.4–8.4)
TRIGL SERPL-MCNC: 79 MG/DL

## 2022-10-05 PROCEDURE — 83036 HEMOGLOBIN GLYCOSYLATED A1C: CPT

## 2022-10-05 PROCEDURE — 36415 COLL VENOUS BLD VENIPUNCTURE: CPT

## 2022-10-05 PROCEDURE — 80076 HEPATIC FUNCTION PANEL: CPT

## 2022-10-05 PROCEDURE — 80061 LIPID PANEL: CPT

## 2022-12-12 DIAGNOSIS — R73.01 IMPAIRED FASTING GLUCOSE: Primary | ICD-10-CM

## 2023-01-12 ENCOUNTER — APPOINTMENT (OUTPATIENT)
Dept: LAB | Facility: CLINIC | Age: 63
End: 2023-01-12

## 2023-01-12 DIAGNOSIS — R73.01 IMPAIRED FASTING GLUCOSE: ICD-10-CM

## 2023-01-12 LAB
EST. AVERAGE GLUCOSE BLD GHB EST-MCNC: 128 MG/DL
HBA1C MFR BLD: 6.1 %

## 2023-04-04 DIAGNOSIS — E78.2 MIXED HYPERLIPIDEMIA: ICD-10-CM

## 2023-04-04 RX ORDER — ATORVASTATIN CALCIUM 20 MG/1
20 TABLET, FILM COATED ORAL DAILY
Qty: 90 TABLET | Refills: 0 | Status: SHIPPED | OUTPATIENT
Start: 2023-04-04

## 2023-06-30 DIAGNOSIS — E78.2 MIXED HYPERLIPIDEMIA: ICD-10-CM

## 2023-06-30 RX ORDER — ATORVASTATIN CALCIUM 20 MG/1
20 TABLET, FILM COATED ORAL DAILY
Qty: 90 TABLET | Refills: 3 | Status: SHIPPED | OUTPATIENT
Start: 2023-06-30

## 2023-06-30 RX ORDER — ATORVASTATIN CALCIUM 20 MG/1
TABLET, FILM COATED ORAL
Qty: 90 TABLET | Refills: 0 | Status: SHIPPED | OUTPATIENT
Start: 2023-06-30 | End: 2023-06-30 | Stop reason: SDUPTHER

## 2023-07-05 DIAGNOSIS — E78.2 MIXED HYPERLIPIDEMIA: Primary | ICD-10-CM

## 2023-07-05 RX ORDER — ATORVASTATIN CALCIUM 10 MG/1
10 TABLET, FILM COATED ORAL DAILY
Qty: 10 TABLET | Refills: 0 | Status: SHIPPED | OUTPATIENT
Start: 2023-07-05 | End: 2023-07-05

## 2023-07-05 RX ORDER — ATORVASTATIN CALCIUM 20 MG/1
20 TABLET, FILM COATED ORAL DAILY
Qty: 10 TABLET | Refills: 0 | Status: SHIPPED | OUTPATIENT
Start: 2023-07-05 | End: 2023-07-10

## 2023-07-06 ENCOUNTER — APPOINTMENT (OUTPATIENT)
Dept: LAB | Facility: CLINIC | Age: 63
End: 2023-07-06
Payer: COMMERCIAL

## 2023-07-06 DIAGNOSIS — Z12.5 SCREENING PSA (PROSTATE SPECIFIC ANTIGEN): ICD-10-CM

## 2023-07-06 DIAGNOSIS — R73.01 IMPAIRED FASTING GLUCOSE: ICD-10-CM

## 2023-07-06 DIAGNOSIS — E78.2 MIXED HYPERLIPIDEMIA: ICD-10-CM

## 2023-07-06 LAB
ALBUMIN SERPL BCP-MCNC: 3.7 G/DL (ref 3.5–5)
ALP SERPL-CCNC: 94 U/L (ref 46–116)
ALT SERPL W P-5'-P-CCNC: 52 U/L (ref 12–78)
ANION GAP SERPL CALCULATED.3IONS-SCNC: 4 MMOL/L
AST SERPL W P-5'-P-CCNC: 30 U/L (ref 5–45)
BASOPHILS # BLD AUTO: 0.03 THOUSANDS/ÂΜL (ref 0–0.1)
BASOPHILS NFR BLD AUTO: 0 % (ref 0–1)
BILIRUB SERPL-MCNC: 0.89 MG/DL (ref 0.2–1)
BUN SERPL-MCNC: 15 MG/DL (ref 5–25)
CALCIUM SERPL-MCNC: 9.1 MG/DL (ref 8.3–10.1)
CHLORIDE SERPL-SCNC: 109 MMOL/L (ref 96–108)
CHOLEST SERPL-MCNC: 138 MG/DL
CO2 SERPL-SCNC: 25 MMOL/L (ref 21–32)
CREAT SERPL-MCNC: 0.96 MG/DL (ref 0.6–1.3)
EOSINOPHIL # BLD AUTO: 0.15 THOUSAND/ÂΜL (ref 0–0.61)
EOSINOPHIL NFR BLD AUTO: 2 % (ref 0–6)
ERYTHROCYTE [DISTWIDTH] IN BLOOD BY AUTOMATED COUNT: 14.4 % (ref 11.6–15.1)
GFR SERPL CREATININE-BSD FRML MDRD: 82 ML/MIN/1.73SQ M
GLUCOSE P FAST SERPL-MCNC: 138 MG/DL (ref 65–99)
HCT VFR BLD AUTO: 45 % (ref 36.5–49.3)
HDLC SERPL-MCNC: 61 MG/DL
HGB BLD-MCNC: 14.8 G/DL (ref 12–17)
IMM GRANULOCYTES # BLD AUTO: 0.03 THOUSAND/UL (ref 0–0.2)
IMM GRANULOCYTES NFR BLD AUTO: 0 % (ref 0–2)
LDLC SERPL CALC-MCNC: 64 MG/DL (ref 0–100)
LYMPHOCYTES # BLD AUTO: 2.64 THOUSANDS/ÂΜL (ref 0.6–4.47)
LYMPHOCYTES NFR BLD AUTO: 30 % (ref 14–44)
MCH RBC QN AUTO: 28.7 PG (ref 26.8–34.3)
MCHC RBC AUTO-ENTMCNC: 32.9 G/DL (ref 31.4–37.4)
MCV RBC AUTO: 87 FL (ref 82–98)
MONOCYTES # BLD AUTO: 0.97 THOUSAND/ÂΜL (ref 0.17–1.22)
MONOCYTES NFR BLD AUTO: 11 % (ref 4–12)
NEUTROPHILS # BLD AUTO: 5.02 THOUSANDS/ÂΜL (ref 1.85–7.62)
NEUTS SEG NFR BLD AUTO: 57 % (ref 43–75)
NRBC BLD AUTO-RTO: 0 /100 WBCS
PLATELET # BLD AUTO: 226 THOUSANDS/UL (ref 149–390)
PMV BLD AUTO: 12.1 FL (ref 8.9–12.7)
POTASSIUM SERPL-SCNC: 4.5 MMOL/L (ref 3.5–5.3)
PROT SERPL-MCNC: 7.1 G/DL (ref 6.4–8.4)
PSA SERPL-MCNC: 1.9 NG/ML (ref 0–4)
RBC # BLD AUTO: 5.16 MILLION/UL (ref 3.88–5.62)
SODIUM SERPL-SCNC: 138 MMOL/L (ref 135–147)
TRIGL SERPL-MCNC: 67 MG/DL
WBC # BLD AUTO: 8.84 THOUSAND/UL (ref 4.31–10.16)

## 2023-07-06 PROCEDURE — 80061 LIPID PANEL: CPT

## 2023-07-06 PROCEDURE — 80053 COMPREHEN METABOLIC PANEL: CPT

## 2023-07-06 PROCEDURE — 85025 COMPLETE CBC W/AUTO DIFF WBC: CPT

## 2023-07-06 PROCEDURE — G0103 PSA SCREENING: HCPCS

## 2023-07-06 PROCEDURE — 83036 HEMOGLOBIN GLYCOSYLATED A1C: CPT

## 2023-07-06 PROCEDURE — 36415 COLL VENOUS BLD VENIPUNCTURE: CPT

## 2023-07-08 LAB
EST. AVERAGE GLUCOSE BLD GHB EST-MCNC: 117 MG/DL
HBA1C MFR BLD: 5.7 %

## 2023-07-10 ENCOUNTER — OFFICE VISIT (OUTPATIENT)
Dept: INTERNAL MEDICINE CLINIC | Facility: CLINIC | Age: 63
End: 2023-07-10
Payer: COMMERCIAL

## 2023-07-10 VITALS
HEIGHT: 70 IN | DIASTOLIC BLOOD PRESSURE: 74 MMHG | OXYGEN SATURATION: 98 % | WEIGHT: 164.4 LBS | HEART RATE: 74 BPM | BODY MASS INDEX: 23.54 KG/M2 | SYSTOLIC BLOOD PRESSURE: 128 MMHG

## 2023-07-10 DIAGNOSIS — E78.2 MIXED HYPERLIPIDEMIA: ICD-10-CM

## 2023-07-10 DIAGNOSIS — Z00.00 WELCOME TO MEDICARE PREVENTIVE VISIT: Primary | ICD-10-CM

## 2023-07-10 DIAGNOSIS — R73.01 IMPAIRED FASTING GLUCOSE: ICD-10-CM

## 2023-07-10 DIAGNOSIS — Z23 NEED FOR VACCINATION: ICD-10-CM

## 2023-07-10 DIAGNOSIS — Z12.5 SCREENING PSA (PROSTATE SPECIFIC ANTIGEN): ICD-10-CM

## 2023-07-10 DIAGNOSIS — Z13.6 SCREENING FOR CARDIOVASCULAR CONDITION: ICD-10-CM

## 2023-07-10 DIAGNOSIS — F17.200 CURRENT EVERY DAY SMOKER: ICD-10-CM

## 2023-07-10 PROCEDURE — G0402 INITIAL PREVENTIVE EXAM: HCPCS | Performed by: INTERNAL MEDICINE

## 2023-07-10 PROCEDURE — G0403 EKG FOR INITIAL PREVENT EXAM: HCPCS | Performed by: INTERNAL MEDICINE

## 2023-07-10 PROCEDURE — 90677 PCV20 VACCINE IM: CPT

## 2023-07-10 PROCEDURE — 90471 IMMUNIZATION ADMIN: CPT

## 2023-07-10 NOTE — PATIENT INSTRUCTIONS
Medicare Preventive Visit Patient Instructions  Thank you for completing your Welcome to Medicare Visit or Medicare Annual Wellness Visit today. Your next wellness visit will be due in one year (7/10/2024). The screening/preventive services that you may require over the next 5-10 years are detailed below. Some tests may not apply to you based off risk factors and/or age. Screening tests ordered at today's visit but not completed yet may show as past due. Also, please note that scanned in results may not display below. Preventive Screenings:  Service Recommendations Previous Testing/Comments   Colorectal Cancer Screening  · Colonoscopy    · Fecal Occult Blood Test (FOBT)/Fecal Immunochemical Test (FIT)  · Fecal DNA/Cologuard Test  · Flexible Sigmoidoscopy Age: 43-73 years old   Colonoscopy: every 10 years (May be performed more frequently if at higher risk)  OR  FOBT/FIT: every 1 year  OR  Cologuard: every 3 years  OR  Sigmoidoscopy: every 5 years  Screening may be recommended earlier than age 39 if at higher risk for colorectal cancer. Also, an individualized decision between you and your healthcare provider will decide whether screening between the ages of 77-80 would be appropriate. Colonoscopy: 12/17/2013  FOBT/FIT: Not on file  Cologuard: 06/29/2021  Sigmoidoscopy: Not on file          Prostate Cancer Screening Individualized decision between patient and health care provider in men between ages of 53-66   Medicare will cover every 12 months beginning on the day after your 50th birthday PSA: 1.90 ng/mL           Hepatitis C Screening Once for adults born between 1945 and 1965  More frequently in patients at high risk for Hepatitis C Hep C Antibody: 07/27/2018        Diabetes Screening 1-2 times per year if you're at risk for diabetes or have pre-diabetes Fasting glucose: 138 mg/dL (7/6/2023)  A1C: 5.7 % (7/6/2023)      Cholesterol Screening Once every 5 years if you don't have a lipid disorder.  May order more often based on risk factors. Lipid panel: 07/06/2023         Other Preventive Screenings Covered by Medicare:  1. Abdominal Aortic Aneurysm (AAA) Screening: covered once if your at risk. You're considered to be at risk if you have a family history of AAA or a male between the age of 70-76 who smoking at least 100 cigarettes in your lifetime. 2. Lung Cancer Screening: covers low dose CT scan once per year if you meet all of the following conditions: (1) Age 48-67; (2) No signs or symptoms of lung cancer; (3) Current smoker or have quit smoking within the last 15 years; (4) You have a tobacco smoking history of at least 20 pack years (packs per day x number of years you smoked); (5) You get a written order from a healthcare provider. 3. Glaucoma Screening: covered annually if you're considered high risk: (1) You have diabetes OR (2) Family history of glaucoma OR (3)  aged 48 and older OR (3)  American aged 72 and older  3. Osteoporosis Screening: covered every 2 years if you meet one of the following conditions: (1) Have a vertebral abnormality; (2) On glucocorticoid therapy for more than 3 months; (3) Have primary hyperparathyroidism; (4) On osteoporosis medications and need to assess response to drug therapy. 5. HIV Screening: covered annually if you're between the age of 14-79. Also covered annually if you are younger than 13 and older than 72 with risk factors for HIV infection. For pregnant patients, it is covered up to 3 times per pregnancy.     Immunizations:  Immunization Recommendations   Influenza Vaccine Annual influenza vaccination during flu season is recommended for all persons aged >= 6 months who do not have contraindications   Pneumococcal Vaccine   * Pneumococcal conjugate vaccine = PCV13 (Prevnar 13), PCV15 (Vaxneuvance), PCV20 (Prevnar 20)  * Pneumococcal polysaccharide vaccine = PPSV23 (Pneumovax) Adults 20-63 years old: 1-3 doses may be recommended based on certain risk factors  Adults 72 years old: 1-2 doses may be recommended based off what pneumonia vaccine you previously received   Hepatitis B Vaccine 3 dose series if at intermediate or high risk (ex: diabetes, end stage renal disease, liver disease)   Tetanus (Td) Vaccine - COST NOT COVERED BY MEDICARE PART B Following completion of primary series, a booster dose should be given every 10 years to maintain immunity against tetanus. Td may also be given as tetanus wound prophylaxis. Tdap Vaccine - COST NOT COVERED BY MEDICARE PART B Recommended at least once for all adults. For pregnant patients, recommended with each pregnancy. Shingles Vaccine (Shingrix) - COST NOT COVERED BY MEDICARE PART B  2 shot series recommended in those aged 48 and above     Health Maintenance Due:      Topic Date Due   • Colorectal Cancer Screening  06/30/2021   • HIV Screening  Completed   • Hepatitis C Screening  Completed     Immunizations Due:      Topic Date Due   • Pneumococcal Vaccine: 65+ Years (2 - PCV) 06/06/2018   • COVID-19 Vaccine (4 - Pfizer series) 02/16/2022   • Influenza Vaccine (1) 09/01/2023     Advance Directives   What are advance directives? Advance directives are legal documents that state your wishes and plans for medical care. These plans are made ahead of time in case you lose your ability to make decisions for yourself. Advance directives can apply to any medical decision, such as the treatments you want, and if you want to donate organs. What are the types of advance directives? There are many types of advance directives, and each state has rules about how to use them. You may choose a combination of any of the following:  · Living will: This is a written record of the treatment you want. You can also choose which treatments you do not want, which to limit, and which to stop at a certain time. This includes surgery, medicine, IV fluid, and tube feedings. · Durable power of  for healthcare [facility] [facility]):   This is a written record that states who you want to make healthcare choices for you when you are unable to make them for yourself. This person, called a proxy, is usually a family member or a friend. You may choose more than 1 proxy. · Do not resuscitate (DNR) order:  A DNR order is used in case your heart stops beating or you stop breathing. It is a request not to have certain forms of treatment, such as CPR. A DNR order may be included in other types of advance directives. · Medical directive: This covers the care that you want if you are in a coma, near death, or unable to make decisions for yourself. You can list the treatments you want for each condition. Treatment may include pain medicine, surgery, blood transfusions, dialysis, IV or tube feedings, and a ventilator (breathing machine). · Values history: This document has questions about your views, beliefs, and how you feel and think about life. This information can help others choose the care that you would choose. Why are advance directives important? An advance directive helps you control your care. Although spoken wishes may be used, it is better to have your wishes written down. Spoken wishes can be misunderstood, or not followed. Treatments may be given even if you do not want them. An advance directive may make it easier for your family to make difficult choices about your care. Cigarette Smoking and Your Health   Risks to your health if you smoke:  Nicotine and other chemicals found in tobacco damage every cell in your body. Even if you are a light smoker, you have an increased risk for cancer, heart disease, and lung disease. If you are pregnant or have diabetes, smoking increases your risk for complications. Benefits to your health if you stop smoking:   · You decrease respiratory symptoms such as coughing, wheezing, and shortness of breath.    · You reduce your risk for cancers of the lung, mouth, throat, kidney, bladder, pancreas, stomach, and cervix. If you already have cancer, you increase the benefits of chemotherapy. You also reduce your risk for cancer returning or a second cancer from developing. · You reduce your risk for heart disease, blood clots, heart attack, and stroke. · You reduce your risk for lung infections, and diseases such as pneumonia, asthma, chronic bronchitis, and emphysema. · Your circulation improves. More oxygen can be delivered to your body. If you have diabetes, you lower your risk for complications, such as kidney, artery, and eye diseases. You also lower your risk for nerve damage. Nerve damage can lead to amputations, poor vision, and blindness. · You improve your body's ability to heal and to fight infections. For more information and support to stop smoking:   · Smokefree. gov  Phone: 5- 561 - 143-2719  Web Address: www.[employer].com. [employer] [address] Information is for End User's use only and may not be sold, redistributed or otherwise used for commercial purposes.  All illustrations and images included in CareNotes® are the copyrighted property of A.D.A.M., Inc. or  [employer]

## 2023-07-10 NOTE — PROGRESS NOTES
Assessment and Plan:     Problem List Items Addressed This Visit        Endocrine    Impaired fasting glucose    Relevant Orders    CBC and differential    Comprehensive metabolic panel    HEMOGLOBIN A1C W/ EAG ESTIMATION    HEMOGLOBIN A1C W/ EAG ESTIMATION       Other    Screening PSA (prostate specific antigen)    Relevant Orders    PSA, Total Screen    Current every day smoker    Relevant Orders    CT lung screening program    Mixed hyperlipidemia    Relevant Orders    CBC and differential    Comprehensive metabolic panel    Lipid panel   Other Visit Diagnoses     Welcome to Medicare preventive visit    -  Primary    Screening for cardiovascular condition        Relevant Orders    POCT ECG (Completed)    Need for vaccination        Relevant Orders    Pneumococcal Conjugate Vaccine 20-valent (Pcv20)           Preventive health issues were discussed with patient, and age appropriate screening tests were ordered as noted in patient's After Visit Summary. Personalized health advice and appropriate referrals for health education or preventive services given if needed, as noted in patient's After Visit Summary. History of Present Illness:     Patient presents for a Medicare Wellness Visit    HPI   Patient Care Team:  Dayanna Flowers MD as PCP - General     Review of Systems:     Review of Systems   Constitutional: Negative for fever and unexpected weight change. Respiratory: Negative for shortness of breath. Cardiovascular: Negative for chest pain, palpitations and leg swelling. Gastrointestinal: Negative for abdominal pain, constipation and diarrhea. Genitourinary: Negative for difficulty urinating. Musculoskeletal: Positive for arthralgias (chronic in right shoulder with good ROM). Negative for myalgias. Neurological: Negative for dizziness and headaches.         Problem List:     Patient Active Problem List   Diagnosis   • Erectile dysfunction   • Impaired fasting glucose   • Current every day smoker • Plantar wart of left foot   • Screening PSA (prostate specific antigen)   • Mixed hyperlipidemia      Past Medical and Surgical History:     Past Medical History:   Diagnosis Date   • Inguinal hernia     last assessed 9/3/13; resolved 7/29/16   • Screening for diabetes mellitus 6/7/2018   • Wellness examination 6/7/2018     Past Surgical History:   Procedure Laterality Date   • INGUINAL HERNIA REPAIR        Family History:     Family History   Problem Relation Age of Onset   • Heart attack Father         myocardial infarction   • Alcohol abuse Mother       Social History:     Social History     Socioeconomic History   • Marital status: /Civil Union     Spouse name: None   • Number of children: None   • Years of education: None   • Highest education level: None   Occupational History   • None   Tobacco Use   • Smoking status: Every Day     Packs/day: 0.50     Years: 50.00     Total pack years: 25.00     Types: Cigarettes     Start date: 1976   • Smokeless tobacco: Never   Vaping Use   • Vaping Use: Every day   • Start date: 4/14/2020   • Substances: THC   Substance and Sexual Activity   • Alcohol use: No   • Drug use: Yes     Types: Marijuana   • Sexual activity: Yes     Partners: Female   Other Topics Concern   • None   Social History Narrative    Exercising regularly      Social Determinants of Health     Financial Resource Strain: Low Risk  (7/8/2023)    Overall Financial Resource Strain (CARDIA)    • Difficulty of Paying Living Expenses: Not hard at all   Food Insecurity: Not on file   Transportation Needs: No Transportation Needs (7/8/2023)    PRAPARE - Transportation    • Lack of Transportation (Medical): No    • Lack of Transportation (Non-Medical):  No   Physical Activity: Not on file   Stress: Not on file   Social Connections: Not on file   Intimate Partner Violence: Not on file   Housing Stability: Not on file      Medications and Allergies:     Current Outpatient Medications   Medication Sig Dispense Refill   • atorvastatin (LIPITOR) 20 mg tablet Take 1 tablet (20 mg total) by mouth daily 90 tablet 3   • Multiple Vitamin (DAILY VALUE MULTIVITAMIN) TABS Take 1 tablet by mouth daily       No current facility-administered medications for this visit. No Known Allergies   Immunizations:     Immunization History   Administered Date(s) Administered   • COVID-19 PFIZER VACCINE 0.3 ML IM 04/06/2021, 04/29/2021, 12/22/2021   • Pneumococcal Polysaccharide PPV23 06/06/2017   • Tdap 06/06/2017      Health Maintenance:         Topic Date Due   • Colorectal Cancer Screening  06/30/2021   • Lung Cancer Screening  07/07/2023   • HIV Screening  Completed   • Hepatitis C Screening  Completed         Topic Date Due   • Pneumococcal Vaccine: 65+ Years (2 - PCV) 06/06/2018   • COVID-19 Vaccine (4 - Pfizer series) 02/16/2022   • Influenza Vaccine (1) 09/01/2023      Medicare Screening Tests and Risk Assessments:     Darlyn Essex is here for his Subsequent Wellness visit. Health Risk Assessment:   Patient rates overall health as very good. Patient feels that their physical health rating is slightly better. Patient is very satisfied with their life. Eyesight was rated as same. Hearing was rated as same. Patient feels that their emotional and mental health rating is same. Patients states they are never, rarely angry. Patient states they are never, rarely unusually tired/fatigued. Pain experienced in the last 7 days has been some. Patient's pain rating has been 4/10. Patient states that he has experienced no weight loss or gain in last 6 months. Depression Screening:   PHQ-2 Score: 0      Fall Risk Screening: In the past year, patient has experienced: no history of falling in past year      Home Safety:  Patient does not have trouble with stairs inside or outside of their home. Patient has working smoke alarms and has no working carbon monoxide detector. Home safety hazards include: none.      Nutrition:   Current diet is Low Carb. Medications:   Patient is not currently taking any over-the-counter supplements. Patient is able to manage medications. Activities of Daily Living (ADLs)/Instrumental Activities of Daily Living (IADLs):   Walk and transfer into and out of bed and chair?: Yes  Dress and groom yourself?: Yes    Bathe or shower yourself?: Yes    Feed yourself? Yes  Do your laundry/housekeeping?: Yes  Manage your money, pay your bills and track your expenses?: Yes  Make your own meals?: Yes    Do your own shopping?: Yes    Previous Hospitalizations:   Any hospitalizations or ED visits within the last 12 months?: No      Advance Care Planning:   Living will: No    Durable POA for healthcare: No    Advanced directive: No    Five wishes given: Yes      Cognitive Screening:   Provider or family/friend/caregiver concerned regarding cognition?: No    PREVENTIVE SCREENINGS      Cardiovascular Screening:    General: Screening Not Indicated and History Lipid Disorder      Diabetes Screening:     General: Screening Current      Colorectal Cancer Screening:     General: Screening Current      Prostate Cancer Screening:    General: Screening Current      Abdominal Aortic Aneurysm (AAA) Screening:    Risk factors include: age between 70-75 yo and tobacco use        General: Screening Current      Lung Cancer Screening:     General: Screening Not Indicated and Risks and Benefits Discussed    Due for: Low Dose CT (LDCT)      Hepatitis C Screening:    General: Screening Current    Screening, Brief Intervention, and Referral to Treatment (SBIRT)    Screening  Typical number of drinks in a day: 0  Typical number of drinks in a week: 0  Interpretation: Low risk drinking behavior.     AUDIT-C Screenin) How often did you have a drink containing alcohol in the past year? never  2) How many drinks did you have on a typical day when you were drinking in the past year? 0  3) How often did you have 6 or more drinks on one occasion in the past year? never    AUDIT-C Score: 0  Interpretation: Score 0-3 (male): Negative screen for alcohol misuse    Single Item Drug Screening:  How often have you used an illegal drug (including marijuana) or a prescription medication for non-medical reasons in the past year? never    Single Item Drug Screen Score: 0  Interpretation: Negative screen for possible drug use disorder    Vision Screening    Right eye Left eye Both eyes   Without correction 20/20 20/30 20/20   With correction           Physical Exam:     /74 (BP Location: Left arm, Patient Position: Sitting, Cuff Size: Standard)   Pulse 74   Ht 5' 9.69" (1.77 m)   Wt 74.6 kg (164 lb 6.4 oz)   SpO2 98%   BMI 23.80 kg/m²     Physical Exam  Vitals and nursing note reviewed. Constitutional:       General: He is not in acute distress. Appearance: He is well-developed. He is not ill-appearing, toxic-appearing or diaphoretic. HENT:      Head: Normocephalic and atraumatic. Right Ear: There is no impacted cerumen. Left Ear: There is no impacted cerumen. Eyes:      Conjunctiva/sclera: Conjunctivae normal.   Cardiovascular:      Rate and Rhythm: Normal rate and regular rhythm. Heart sounds: No murmur heard. Pulmonary:      Effort: Pulmonary effort is normal. No respiratory distress. Breath sounds: Normal breath sounds. Abdominal:      Palpations: Abdomen is soft. Tenderness: There is no abdominal tenderness. Musculoskeletal:      Cervical back: Neck supple. Right lower leg: No edema. Left lower leg: No edema. Skin:     Capillary Refill: Capillary refill takes less than 2 seconds. Neurological:      Mental Status: He is alert.    Psychiatric:         Mood and Affect: Mood normal.         Behavior: Behavior normal.          Sg Marquez MD

## 2023-07-13 DIAGNOSIS — K13.70 LESION OF UVULA: Primary | ICD-10-CM

## 2023-07-31 PROCEDURE — 88305 TISSUE EXAM BY PATHOLOGIST: CPT | Performed by: STUDENT IN AN ORGANIZED HEALTH CARE EDUCATION/TRAINING PROGRAM

## 2023-08-03 PROCEDURE — 88305 TISSUE EXAM BY PATHOLOGIST: CPT | Performed by: STUDENT IN AN ORGANIZED HEALTH CARE EDUCATION/TRAINING PROGRAM

## 2023-08-09 ENCOUNTER — HOSPITAL ENCOUNTER (OUTPATIENT)
Dept: CT IMAGING | Facility: HOSPITAL | Age: 63
Discharge: HOME | End: 2023-08-09
Payer: MEDICARE

## 2023-08-09 DIAGNOSIS — F17.200 CURRENT EVERY DAY SMOKER: ICD-10-CM

## 2023-08-09 PROCEDURE — 71271 CT THORAX LUNG CANCER SCR C-: CPT

## 2023-09-08 ENCOUNTER — HOSPITAL ENCOUNTER (OUTPATIENT)
Dept: NON INVASIVE DIAGNOSTICS | Facility: CLINIC | Age: 63
Discharge: HOME | End: 2023-09-08
Payer: MEDICARE

## 2023-09-08 DIAGNOSIS — I65.22 LEFT CAROTID STENOSIS: ICD-10-CM

## 2023-09-08 PROCEDURE — 93880 EXTRACRANIAL BILAT STUDY: CPT | Performed by: SURGERY

## 2023-09-08 PROCEDURE — 93880 EXTRACRANIAL BILAT STUDY: CPT

## 2024-02-21 PROBLEM — Z12.5 SCREENING PSA (PROSTATE SPECIFIC ANTIGEN): Status: RESOLVED | Noted: 2018-06-07 | Resolved: 2024-02-21

## 2024-05-10 DIAGNOSIS — Z12.5 SCREENING PSA (PROSTATE SPECIFIC ANTIGEN): ICD-10-CM

## 2024-05-10 DIAGNOSIS — E78.2 MIXED HYPERLIPIDEMIA: ICD-10-CM

## 2024-05-10 DIAGNOSIS — R73.01 IMPAIRED FASTING GLUCOSE: Primary | ICD-10-CM

## 2024-06-10 DIAGNOSIS — E78.2 MIXED HYPERLIPIDEMIA: ICD-10-CM

## 2024-06-10 RX ORDER — ATORVASTATIN CALCIUM 20 MG/1
20 TABLET, FILM COATED ORAL DAILY
Qty: 90 TABLET | Refills: 1 | Status: SHIPPED | OUTPATIENT
Start: 2024-06-10

## 2024-08-15 ENCOUNTER — APPOINTMENT (OUTPATIENT)
Dept: LAB | Facility: CLINIC | Age: 64
End: 2024-08-15
Payer: MEDICARE

## 2024-08-15 LAB
ALBUMIN SERPL BCG-MCNC: 4.1 G/DL (ref 3.5–5)
ALP SERPL-CCNC: 84 U/L (ref 34–104)
ALT SERPL W P-5'-P-CCNC: 25 U/L (ref 7–52)
ANION GAP SERPL CALCULATED.3IONS-SCNC: 6 MMOL/L (ref 4–13)
AST SERPL W P-5'-P-CCNC: 23 U/L (ref 13–39)
BASOPHILS # BLD AUTO: 0.03 THOUSANDS/ÂΜL (ref 0–0.1)
BASOPHILS NFR BLD AUTO: 0 % (ref 0–1)
BILIRUB SERPL-MCNC: 0.79 MG/DL (ref 0.2–1)
BUN SERPL-MCNC: 12 MG/DL (ref 5–25)
CALCIUM SERPL-MCNC: 8.7 MG/DL (ref 8.4–10.2)
CHLORIDE SERPL-SCNC: 105 MMOL/L (ref 96–108)
CHOLEST SERPL-MCNC: 123 MG/DL
CO2 SERPL-SCNC: 26 MMOL/L (ref 21–32)
CREAT SERPL-MCNC: 0.87 MG/DL (ref 0.6–1.3)
EOSINOPHIL # BLD AUTO: 0.13 THOUSAND/ÂΜL (ref 0–0.61)
EOSINOPHIL NFR BLD AUTO: 1 % (ref 0–6)
ERYTHROCYTE [DISTWIDTH] IN BLOOD BY AUTOMATED COUNT: 14.4 % (ref 11.6–15.1)
EST. AVERAGE GLUCOSE BLD GHB EST-MCNC: 143 MG/DL
GFR SERPL CREATININE-BSD FRML MDRD: 89 ML/MIN/1.73SQ M
GLUCOSE P FAST SERPL-MCNC: 120 MG/DL (ref 65–99)
HBA1C MFR BLD: 6.6 %
HCT VFR BLD AUTO: 42.7 % (ref 36.5–49.3)
HDLC SERPL-MCNC: 53 MG/DL
HGB BLD-MCNC: 14 G/DL (ref 12–17)
IMM GRANULOCYTES # BLD AUTO: 0.04 THOUSAND/UL (ref 0–0.2)
IMM GRANULOCYTES NFR BLD AUTO: 0 % (ref 0–2)
LDLC SERPL CALC-MCNC: 54 MG/DL (ref 0–100)
LYMPHOCYTES # BLD AUTO: 2.85 THOUSANDS/ÂΜL (ref 0.6–4.47)
LYMPHOCYTES NFR BLD AUTO: 31 % (ref 14–44)
MCH RBC QN AUTO: 28.3 PG (ref 26.8–34.3)
MCHC RBC AUTO-ENTMCNC: 32.8 G/DL (ref 31.4–37.4)
MCV RBC AUTO: 86 FL (ref 82–98)
MONOCYTES # BLD AUTO: 0.86 THOUSAND/ÂΜL (ref 0.17–1.22)
MONOCYTES NFR BLD AUTO: 10 % (ref 4–12)
NEUTROPHILS # BLD AUTO: 5.16 THOUSANDS/ÂΜL (ref 1.85–7.62)
NEUTS SEG NFR BLD AUTO: 58 % (ref 43–75)
NONHDLC SERPL-MCNC: 70 MG/DL
NRBC BLD AUTO-RTO: 0 /100 WBCS
PLATELET # BLD AUTO: 217 THOUSANDS/UL (ref 149–390)
PMV BLD AUTO: 12.1 FL (ref 8.9–12.7)
POTASSIUM SERPL-SCNC: 3.8 MMOL/L (ref 3.5–5.3)
PROT SERPL-MCNC: 6.6 G/DL (ref 6.4–8.4)
PSA SERPL-MCNC: 3.07 NG/ML (ref 0–4)
RBC # BLD AUTO: 4.95 MILLION/UL (ref 3.88–5.62)
SODIUM SERPL-SCNC: 137 MMOL/L (ref 135–147)
TRIGL SERPL-MCNC: 79 MG/DL
WBC # BLD AUTO: 9.07 THOUSAND/UL (ref 4.31–10.16)

## 2024-08-21 ENCOUNTER — OFFICE VISIT (OUTPATIENT)
Dept: INTERNAL MEDICINE CLINIC | Facility: CLINIC | Age: 64
End: 2024-08-21
Payer: COMMERCIAL

## 2024-08-21 VITALS
WEIGHT: 163 LBS | DIASTOLIC BLOOD PRESSURE: 64 MMHG | SYSTOLIC BLOOD PRESSURE: 120 MMHG | OXYGEN SATURATION: 92 % | HEIGHT: 71 IN | BODY MASS INDEX: 22.82 KG/M2 | HEART RATE: 97 BPM

## 2024-08-21 DIAGNOSIS — F17.210 SMOKING GREATER THAN 20 PACK YEARS: ICD-10-CM

## 2024-08-21 DIAGNOSIS — B07.0 PLANTAR WART: ICD-10-CM

## 2024-08-21 DIAGNOSIS — R73.01 IMPAIRED FASTING GLUCOSE: Primary | ICD-10-CM

## 2024-08-21 DIAGNOSIS — Z12.5 SCREENING PSA (PROSTATE SPECIFIC ANTIGEN): ICD-10-CM

## 2024-08-21 DIAGNOSIS — E78.2 MIXED HYPERLIPIDEMIA: ICD-10-CM

## 2024-08-21 DIAGNOSIS — Z00.00 MEDICARE ANNUAL WELLNESS VISIT, INITIAL: ICD-10-CM

## 2024-08-21 DIAGNOSIS — Z12.11 SCREEN FOR COLON CANCER: ICD-10-CM

## 2024-08-21 PROCEDURE — G0438 PPPS, INITIAL VISIT: HCPCS | Performed by: INTERNAL MEDICINE

## 2024-08-21 PROCEDURE — 99214 OFFICE O/P EST MOD 30 MIN: CPT | Performed by: INTERNAL MEDICINE

## 2024-08-21 NOTE — PROGRESS NOTES
Reason for visit is AWV    Encounter provider Lea Reyes, MD    Provider located at [address] [facility]  [address] [facility] [place] [address]      Recent Visits  No visits were found meeting these conditions.  Showing recent visits within past 7 days and meeting all other requirements  Today's Visits  Date Type Provider Dept   24 Office Visit Lea Reyes, MD  [facility]   Showing today's visits and meeting all other requirements  Future Appointments  No visits were found meeting these conditions.  Showing future appointments within next 150 days and meeting all other requirements     Ambulatory Visit  Name: Roberto Conley      : 1960      MRN: [id number]  Encounter Provider: Lea Reyes, MD  Encounter Date: 2024   Encounter department: [facility]    Assessment & Plan   1. Impaired fasting glucose  -     Hemoglobin A1C; Future; Expected date: 2025  -     CBC and differential; Future; Expected date: 2025  -     Comprehensive metabolic panel; Future; Expected date: 2025  -     Hemoglobin A1C; Future; Expected date: 2025  2. Smoking greater than 20 pack years  -     CT lung screening program; Future  3. Medicare annual wellness visit, initial  4. Screen for colon cancer  -     Cologuard  5. Plantar wart  -     Ambulatory Referral to Podiatry; Future  6. Mixed hyperlipidemia  -     CBC and differential; Future; Expected date: 2025  -     Comprehensive metabolic panel; Future; Expected date: 2025  -     Lipid panel; Future; Expected date: 2025  7. Screening PSA (prostate specific antigen)  -     PSA, Total Screen; Future; Expected date: 2025       Preventive health issues were discussed with patient, and age appropriate screening tests were ordered as noted in patient's After Visit Summary. Personalized health advice and appropriate referrals for health education or preventive services  given if needed, as noted in patient's After Visit Summary.    History of Present Illness     HPI   Patient Care Team:  Lea Reyes, MD as PCP - General    Review of Systems   Constitutional:  Negative for unexpected weight change.   Respiratory:  Negative for shortness of breath.    Cardiovascular:  Negative for chest pain and palpitations.   Gastrointestinal:  Negative for abdominal pain, constipation and diarrhea.   Genitourinary:  Negative for difficulty urinating.   Neurological:  Negative for dizziness and headaches.     Medical History Reviewed by provider this encounter:  Tobacco  Allergies  Meds  Problems  Med Hx  Surg Hx  Fam Hx       Annual Wellness Visit Questionnaire   Roberto is here for his Initial Wellness visit.     Health Risk Assessment:   Patient rates overall health as very good. Patient feels that their physical health rating is same. Patient is satisfied with their life. Eyesight was rated as same. Hearing was rated as same. Patient feels that their emotional and mental health rating is same. Patients states they are never, rarely angry. Patient states they are never, rarely unusually tired/fatigued. Pain experienced in the last 7 days has been some. Patient's pain rating has been 4/10. Patient states that he has experienced no weight loss or gain in last 6 months.     Depression Screening:   PHQ-2 Score: 0      Fall Risk Screening:   In the past year, patient has experienced: no history of falling in past year      Home Safety:  Patient does not have trouble with stairs inside or outside of their home. Patient has working smoke alarms and has no working carbon monoxide detector. Home safety hazards include: none.     Nutrition:   Current diet is Regular.     Medications:   Patient is currently taking over-the-counter supplements. OTC medications include: see medication list. Patient is able to manage medications.     Activities of Daily Living (ADLs)/Instrumental Activities of Daily  Living (IADLs):   Walk and transfer into and out of bed and chair?: Yes  Dress and groom yourself?: Yes    Bathe or shower yourself?: Yes    Feed yourself? Yes  Do your laundry/housekeeping?: Yes  Manage your money, pay your bills and track your expenses?: Yes  Make your own meals?: Yes    Do your own shopping?: Yes    Previous Hospitalizations:   Any hospitalizations or ED visits within the last 12 months?: No      Advance Care Planning:   Living will: No    Durable POA for healthcare: No    Advanced directive: No    Advanced directive counseling given: Yes    ACP document given: Yes      Cognitive Screening:   Provider or family/friend/caregiver concerned regarding cognition?: No    PREVENTIVE SCREENINGS      Cardiovascular Screening:    General: Screening Not Indicated and History Lipid Disorder      Diabetes Screening:     General: Screening Current      Colorectal Cancer Screening:       Due for: Colonoscopy - Low Risk      Prostate Cancer Screening:    General: Screening Current      Osteoporosis Screening:    General: Screening Not Indicated      Abdominal Aortic Aneurysm (AAA) Screening:    Risk factors include: age between 65-74 yo and tobacco use        General: Screening Current      Lung Cancer Screening:       Due for: Low Dose CT (LDCT)      Hepatitis C Screening:    General: Screening Current    Screening, Brief Intervention, and Referral to Treatment (SBIRT)    Screening  Typical number of drinks in a day: 0  Typical number of drinks in a week: 0  Interpretation: Low risk drinking behavior.    AUDIT-C Screenin) How often did you have a drink containing alcohol in the past year? never  2) How many drinks did you have on a typical day when you were drinking in the past year? 0  3) How often did you have 6 or more drinks on one occasion in the past year? never    AUDIT-C Score: 0  Interpretation: Score 0-3 (male): Negative screen for alcohol misuse    Single Item Drug Screening:  How often have you  "used an illegal drug (including marijuana) or a prescription medication for non-medical reasons in the past year? never    Single Item Drug Screen Score: 0  Interpretation: Negative screen for possible drug use disorder    Social Determinants of Health     Financial Resource Strain: Low Risk  (7/8/2023)    Overall Financial Resource Strain (CARDIA)    • Difficulty of Paying Living Expenses: Not hard at all   Food Insecurity: No Food Insecurity (8/21/2024)    Hunger Vital Sign    • Worried About Running Out of Food in the Last Year: Never true    • Ran Out of Food in the Last Year: Never true   Transportation Needs: No Transportation Needs (8/21/2024)    PRAPARE - Transportation    • Lack of Transportation (Medical): No    • Lack of Transportation (Non-Medical): No   Housing Stability: Low Risk  (8/21/2024)    Housing Stability Vital Sign    • Unable to Pay for Housing in the Last Year: No    • Number of Times Moved in the Last Year: 0    • Homeless in the Last Year: No   Utilities: Not At Risk (8/20/2024)    [facility] Utilities    • Threatened with loss of utilities: No     No results found.    Objective     /64   Pulse 97   Ht 5' 11\" (1.803 m)   Wt 73.9 kg (163 lb)   SpO2 92%   BMI 22.73 kg/m²     Physical Exam  Vitals and nursing note reviewed.   Constitutional:       General: He is not in acute distress.     Appearance: He is well-developed. He is not ill-appearing.   Eyes:      Conjunctiva/sclera: Conjunctivae normal.   Cardiovascular:      Rate and Rhythm: Normal rate and regular rhythm.      Heart sounds: No murmur heard.  Pulmonary:      Effort: Pulmonary effort is normal. No respiratory distress.      Breath sounds: Normal breath sounds.   Abdominal:      Palpations: Abdomen is soft.      Tenderness: There is no abdominal tenderness.   Musculoskeletal:         General: No swelling.      Cervical back: Neck supple.   Skin:     General: Skin is warm and dry.      Capillary Refill: Capillary refill takes " less than 2 seconds.   Neurological:      Mental Status: He is alert.   Psychiatric:         Mood and Affect: Mood is depressed.

## 2024-08-21 NOTE — PATIENT INSTRUCTIONS
Medicare Preventive Visit Patient Instructions  Thank you for completing your Welcome to Medicare Visit or Medicare Annual Wellness Visit today. Your next wellness visit will be due in one year (8/22/2025).  The screening/preventive services that you may require over the next 5-10 years are detailed below. Some tests may not apply to you based off risk factors and/or age. Screening tests ordered at today's visit but not completed yet may show as past due. Also, please note that scanned in results may not display below.  Preventive Screenings:  Service Recommendations Previous Testing/Comments   Colorectal Cancer Screening  Colonoscopy    Fecal Occult Blood Test (FOBT)/Fecal Immunochemical Test (FIT)  Fecal DNA/Cologuard Test  Flexible Sigmoidoscopy Age: 45-75 years old   Colonoscopy: every 10 years (May be performed more frequently if at higher risk)  OR  FOBT/FIT: every 1 year  OR  Cologuard: every 3 years  OR  Sigmoidoscopy: every 5 years  Screening may be recommended earlier than age 45 if at higher risk for colorectal cancer. Also, an individualized decision between you and your healthcare provider will decide whether screening between the ages of 76-85 would be appropriate. Colonoscopy: 12/17/2013  FOBT/FIT: Not on file  Cologuard: Not on file  Sigmoidoscopy: Not on file          Prostate Cancer Screening Individualized decision between patient and health care provider in men between ages of 55-69   Medicare will cover every 12 months beginning on the day after your 50th birthday PSA: 3.072 ng/mL           Hepatitis C Screening Once for adults born between 1945 and 1965  More frequently in patients at high risk for Hepatitis C Hep C Antibody: 07/27/2018        Diabetes Screening 1-2 times per year if you're at risk for diabetes or have pre-diabetes Fasting glucose: 120 mg/dL (8/15/2024)  A1C: 6.6 % (8/15/2024)      Cholesterol Screening Once every 5 years if you don't have a lipid disorder. May order more often  based on risk factors. Lipid panel: 08/15/2024         Other Preventive Screenings Covered by Medicare:  Abdominal Aortic Aneurysm (AAA) Screening: covered once if your at risk. You're considered to be at risk if you have a family history of AAA or a male between the age of 65-75 who smoking at least 100 cigarettes in your lifetime.  Lung Cancer Screening: covers low dose CT scan once per year if you meet all of the following conditions: (1) Age 55-77; (2) No signs or symptoms of lung cancer; (3) Current smoker or have quit smoking within the last 15 years; (4) You have a tobacco smoking history of at least 20 pack years (packs per day x number of years you smoked); (5) You get a written order from a healthcare provider.  Glaucoma Screening: covered annually if you're considered high risk: (1) You have diabetes OR (2) Family history of glaucoma OR (3)  aged 50 and older OR (4)  American aged 65 and older  Osteoporosis Screening: covered every 2 years if you meet one of the following conditions: (1) Have a vertebral abnormality; (2) On glucocorticoid therapy for more than 3 months; (3) Have primary hyperparathyroidism; (4) On osteoporosis medications and need to assess response to drug therapy.  HIV Screening: covered annually if you're between the age of 15-65. Also covered annually if you are younger than 15 and older than 65 with risk factors for HIV infection. For pregnant patients, it is covered up to 3 times per pregnancy.    Immunizations:  Immunization Recommendations   Influenza Vaccine Annual influenza vaccination during flu season is recommended for all persons aged >= 6 months who do not have contraindications   Pneumococcal Vaccine   * Pneumococcal conjugate vaccine = PCV13 (Prevnar 13), PCV15 (Vaxneuvance), PCV20 (Prevnar 20)  * Pneumococcal polysaccharide vaccine = PPSV23 (Pneumovax) Adults 19-65 yo with certain risk factors or if 65+ yo  If never received any pneumonia vaccine:  recommend Prevnar 20 (PCV20)  Give PCV20 if previously received 1 dose of PCV13 or PPSV23   Hepatitis B Vaccine 3 dose series if at intermediate or high risk (ex: diabetes, end stage renal disease, liver disease)   Respiratory syncytial virus (RSV) Vaccine - COVERED BY MEDICARE PART D  * RSVPreF3 (Arexvy) CDC recommends that adults 60 years of age and older may receive a single dose of RSV vaccine using shared clinical decision-making (SCDM)   Tetanus (Td) Vaccine - COST NOT COVERED BY MEDICARE PART B Following completion of primary series, a booster dose should be given every 10 years to maintain immunity against tetanus. Td may also be given as tetanus wound prophylaxis.   Tdap Vaccine - COST NOT COVERED BY MEDICARE PART B Recommended at least once for all adults. For pregnant patients, recommended with each pregnancy.   Shingles Vaccine (Shingrix) - COST NOT COVERED BY MEDICARE PART B  2 shot series recommended in those 19 years and older who have or will have weakened immune systems or those 50 years and older     Health Maintenance Due:      Topic Date Due   • Colorectal Cancer Screening  06/30/2021   • Lung Cancer Screening  08/09/2024   • Hepatitis C Screening  Completed     Immunizations Due:      Topic Date Due   • COVID-19 Vaccine (4 - 2023-24 season) 09/01/2023   • Influenza Vaccine (1) 09/01/2024     Advance Directives   What are advance directives?  Advance directives are legal documents that state your wishes and plans for medical care. These plans are made ahead of time in case you lose your ability to make decisions for yourself. Advance directives can apply to any medical decision, such as the treatments you want, and if you want to donate organs.   What are the types of advance directives?  There are many types of advance directives, and each state has rules about how to use them. You may choose a combination of any of the following:  Living will:  This is a written record of the treatment you  want. You can also choose which treatments you do not want, which to limit, and which to stop at a certain time. This includes surgery, medicine, IV fluid, and tube feedings.   Durable power of  for healthcare (DPAHC):  This is a written record that states who you want to make healthcare choices for you when you are unable to make them for yourself. This person, called a proxy, is usually a family member or a friend. You may choose more than 1 proxy.  Do not resuscitate (DNR) order:  A DNR order is used in case your heart stops beating or you stop breathing. It is a request not to have certain forms of treatment, such as CPR. A DNR order may be included in other types of advance directives.  Medical directive:  This covers the care that you want if you are in a coma, near death, or unable to make decisions for yourself. You can list the treatments you want for each condition. Treatment may include pain medicine, surgery, blood transfusions, dialysis, IV or tube feedings, and a ventilator (breathing machine).  Values history:  This document has questions about your views, beliefs, and how you feel and think about life. This information can help others choose the care that you would choose.  Why are advance directives important?  An advance directive helps you control your care. Although spoken wishes may be used, it is better to have your wishes written down. Spoken wishes can be misunderstood, or not followed. Treatments may be given even if you do not want them. An advance directive may make it easier for your family to make difficult choices about your care.   Cigarette Smoking and Your Health   Risks to your health if you smoke:  Nicotine and other chemicals found in tobacco damage every cell in your body. Even if you are a light smoker, you have an increased risk for cancer, heart disease, and lung disease. If you are pregnant or have diabetes, smoking increases your risk for complications.   Benefits to  your health if you stop smoking:   You decrease respiratory symptoms such as coughing, wheezing, and shortness of breath.   You reduce your risk for cancers of the lung, mouth, throat, kidney, bladder, pancreas, stomach, and cervix. If you already have cancer, you increase the benefits of chemotherapy. You also reduce your risk for cancer returning or a second cancer from developing.   You reduce your risk for heart disease, blood clots, heart attack, and stroke.   You reduce your risk for lung infections, and diseases such as pneumonia, asthma, chronic bronchitis, and emphysema.  Your circulation improves. More oxygen can be delivered to your body. If you have diabetes, you lower your risk for complications, such as kidney, artery, and eye diseases. You also lower your risk for nerve damage. Nerve damage can lead to amputations, poor vision, and blindness.  You improve your body's ability to heal and to fight infections.  For more information and support to stop smoking:   Smokefree.gov  Phone: 7- 761 - 346-7488  Web Address: www.[employer]     © Copyright [employer] 2018 Information is for End User's use only and may not be sold, redistributed or otherwise used for commercial purposes. All illustrations and images included in CareNotes® are the copyrighted property of [employer]M., Inc. or [employer]

## 2024-09-03 ENCOUNTER — OFFICE VISIT (OUTPATIENT)
Dept: PODIATRY | Facility: CLINIC | Age: 64
End: 2024-09-03
Payer: MEDICARE

## 2024-09-03 VITALS
HEART RATE: 72 BPM | WEIGHT: 162 LBS | DIASTOLIC BLOOD PRESSURE: 72 MMHG | HEIGHT: 71 IN | BODY MASS INDEX: 22.68 KG/M2 | SYSTOLIC BLOOD PRESSURE: 119 MMHG | RESPIRATION RATE: 18 BRPM

## 2024-09-03 DIAGNOSIS — S90.851A FOREIGN BODY IN RIGHT FOOT, INITIAL ENCOUNTER: Primary | ICD-10-CM

## 2024-09-03 DIAGNOSIS — B07.0 PLANTAR WART: ICD-10-CM

## 2024-09-03 PROCEDURE — 99203 OFFICE O/P NEW LOW 30 MIN: CPT | Performed by: PODIATRIST

## 2024-09-03 PROCEDURE — 28190 REMOVAL OF FOOT FOREIGN BODY: CPT | Performed by: PODIATRIST

## 2024-09-03 NOTE — PROGRESS NOTES
"Ambulatory Visit  Name: Roberto Conley      : 1960      MRN: [id number]  Encounter Provider: Charlie Mesa DPM  Encounter Date: 9/3/2024   Encounter department: [facility] [place]    Trimmed hyperkeratotic lesion right heel revealing the presence of a foreign body.  There is now an open area approximately 0.4 cm in the central aspect of the heel.  There was slight purulent drainage with pressure.  Bacitracin dressing applied.    Urged patient to refrain from walking barefoot in the future.  Prescribed Bactroban ointment for twice daily application until wound heals.  He will contact me should he have difficulty healing at 3 to 4 weeks.  Explained that this is not a wart but a foreign body.    Assessment & Plan   1. Foreign body in right foot, initial encounter  2. Plantar wart  -     Ambulatory Referral to Podiatry      History of Present Illness     Roberto Conley is a 64 y.o. male who presents with a painful lesion on the bottom of the right heel thought to be a wart.  This lesion first appeared approximately 3 months ago.  Patient does not recall specific trauma but notes that he has been frequently walking barefoot in a creek and may have stepped on an object.  The lesion appears as a callus with a black center.    Review of Systems   Gastrointestinal: Negative.    Musculoskeletal: Negative.    Psychiatric/Behavioral: Negative.             Objective     /72   Pulse 72   Resp 18   Ht 5' 11\" (1.803 m)   Wt 73.5 kg (162 lb)   BMI 22.59 kg/m²     Physical Exam  Constitutional:       Appearance: Normal appearance.   Cardiovascular:      Pulses: Normal pulses.   Musculoskeletal:         General: Normal range of motion.   Skin:     Findings: Lesion present.      Comments: Hyperkeratotic lesion with black discoloration central aspect right heel.  There is no purulent drainage.  Skin lines crossed the lesion.   Neurological:      General: No focal deficit present.      Mental Status: He " is oriented to person, place, and time.           Administrative Statements         Universal Protocol:  procedure performed by consultantConsent: Verbal consent obtained.  Risks and benefits: risks, benefits and alternatives were discussed  Consent given by: patient  Patient understanding: patient states understanding of the procedure being performed  Patient identity confirmed: verbally with patient  Foreign body removal    Date/Time: 9/3/2024 2:15 PM    Performed by: Charlie Mesa DPM  Authorized by: Charlie Mesa DPM  Body area: skin  General location: lower extremity  Location details: right foot  Complexity: simple  Post-procedure assessment: foreign body removed  Patient tolerance: patient tolerated the procedure well with no immediate complications

## 2024-09-06 DIAGNOSIS — S90.851A FOREIGN BODY IN RIGHT FOOT, INITIAL ENCOUNTER: Primary | ICD-10-CM

## 2024-09-07 LAB — COLOGUARD RESULT REPORTABLE: NEGATIVE

## 2024-09-09 ENCOUNTER — HOSPITAL ENCOUNTER (OUTPATIENT)
Dept: CT IMAGING | Facility: HOSPITAL | Age: 64
Discharge: HOME | End: 2024-09-09
Payer: MEDICARE

## 2024-09-09 DIAGNOSIS — F17.210 SMOKING GREATER THAN 20 PACK YEARS: ICD-10-CM

## 2024-09-09 PROCEDURE — 71271 CT THORAX LUNG CANCER SCR C-: CPT

## 2024-09-11 DIAGNOSIS — S90.851A FOREIGN BODY IN RIGHT FOOT, INITIAL ENCOUNTER: Primary | ICD-10-CM

## 2024-09-11 RX ORDER — MUPIROCIN 20 MG/G
OINTMENT TOPICAL 2 TIMES DAILY
Qty: 22 G | Refills: 0 | Status: SHIPPED | OUTPATIENT
Start: 2024-09-11

## 2024-12-09 DIAGNOSIS — E78.2 MIXED HYPERLIPIDEMIA: ICD-10-CM

## 2024-12-10 RX ORDER — ATORVASTATIN CALCIUM 20 MG/1
20 TABLET, FILM COATED ORAL DAILY
Qty: 90 TABLET | Refills: 1 | Status: SHIPPED | OUTPATIENT
Start: 2024-12-10

## 2025-03-04 ENCOUNTER — RESULTS FOLLOW-UP (OUTPATIENT)
Dept: INTERNAL MEDICINE CLINIC | Facility: CLINIC | Age: 65
End: 2025-03-04

## 2025-03-04 ENCOUNTER — APPOINTMENT (OUTPATIENT)
Dept: LAB | Facility: CLINIC | Age: 65
End: 2025-03-04
Payer: MEDICARE

## 2025-03-04 DIAGNOSIS — R73.01 IMPAIRED FASTING GLUCOSE: ICD-10-CM

## 2025-03-04 LAB
EST. AVERAGE GLUCOSE BLD GHB EST-MCNC: 140 MG/DL
HBA1C MFR BLD: 6.5 %

## 2025-03-04 PROCEDURE — 36415 COLL VENOUS BLD VENIPUNCTURE: CPT

## 2025-03-04 PROCEDURE — 83036 HEMOGLOBIN GLYCOSYLATED A1C: CPT

## 2025-06-05 DIAGNOSIS — E78.2 MIXED HYPERLIPIDEMIA: ICD-10-CM

## 2025-06-06 RX ORDER — ATORVASTATIN CALCIUM 20 MG/1
20 TABLET, FILM COATED ORAL DAILY
Qty: 90 TABLET | Refills: 1 | Status: SHIPPED | OUTPATIENT
Start: 2025-06-06